# Patient Record
Sex: MALE | Race: WHITE | NOT HISPANIC OR LATINO | Employment: OTHER | URBAN - METROPOLITAN AREA
[De-identification: names, ages, dates, MRNs, and addresses within clinical notes are randomized per-mention and may not be internally consistent; named-entity substitution may affect disease eponyms.]

---

## 2017-01-03 ENCOUNTER — HOSPITAL ENCOUNTER (OUTPATIENT)
Dept: RADIOLOGY | Facility: MEDICAL CENTER | Age: 62
End: 2017-01-03
Attending: NURSE PRACTITIONER
Payer: COMMERCIAL

## 2017-01-03 ENCOUNTER — OFFICE VISIT (OUTPATIENT)
Dept: URGENT CARE | Facility: PHYSICIAN GROUP | Age: 62
End: 2017-01-03
Payer: COMMERCIAL

## 2017-01-03 VITALS
RESPIRATION RATE: 20 BRPM | HEART RATE: 100 BPM | WEIGHT: 175 LBS | BODY MASS INDEX: 26.52 KG/M2 | SYSTOLIC BLOOD PRESSURE: 144 MMHG | OXYGEN SATURATION: 95 % | HEIGHT: 68 IN | TEMPERATURE: 98.3 F | DIASTOLIC BLOOD PRESSURE: 80 MMHG

## 2017-01-03 DIAGNOSIS — M25.552 HIP PAIN, LEFT: ICD-10-CM

## 2017-01-03 PROCEDURE — 73501 X-RAY EXAM HIP UNI 1 VIEW: CPT | Mod: LT

## 2017-01-03 PROCEDURE — 99202 OFFICE O/P NEW SF 15 MIN: CPT | Performed by: NURSE PRACTITIONER

## 2017-01-03 RX ORDER — LISINOPRIL 5 MG/1
5 TABLET ORAL DAILY
COMMUNITY
End: 2018-04-25 | Stop reason: SDUPTHER

## 2017-01-03 ASSESSMENT — ENCOUNTER SYMPTOMS
FOCAL WEAKNESS: 0
CHILLS: 0
VOMITING: 0
ABDOMINAL PAIN: 0
MYALGIAS: 0
NAUSEA: 0
HIP PAIN: 1
BACK PAIN: 0
FEVER: 0
TINGLING: 0
NECK PAIN: 0
SENSORY CHANGE: 0

## 2017-01-03 NOTE — PROGRESS NOTES
"Subjective:      Demarcus Salas is a 61 y.o. male who presents with Hip Pain            HPI Comments: Medications, Allergies and Prior Medical Hx reviewed and updated in [facility].with patient/family today       Left hip pain, worse when climbing stairs or certain activities. Patient denies any trauma precipitated the incident. Patient does work out.    Hip Pain  This is a new problem. The current episode started 1 to 4 weeks ago. The problem occurs constantly. The problem has been gradually worsening. Pertinent negatives include no abdominal pain, chills, fever, myalgias, nausea, neck pain, rash or vomiting. The symptoms are aggravated by bending and walking (climbing stairs). He has tried nothing for the symptoms. The treatment provided no relief.       Review of Systems   Constitutional: Negative for fever, chills and malaise/fatigue.   Gastrointestinal: Negative for nausea, vomiting and abdominal pain.   Musculoskeletal: Positive for joint pain. Negative for myalgias, back pain and neck pain.   Skin: Negative for rash.   Neurological: Negative for tingling, sensory change and focal weakness.          Objective:     /80 mmHg  Pulse 100  Temp(Src) 36.8 °C (98.3 °F)  Resp 20  Ht 1.727 m (5' 7.99\")  Wt 79.379 kg (175 lb)  BMI 26.61 kg/m2  SpO2 95%     Physical Exam   Constitutional: He appears well-developed and well-nourished. No distress.   HENT:   Head: Normocephalic and atraumatic.   Eyes: Conjunctivae are normal.   Neck: Normal range of motion. Neck supple.   Cardiovascular: Normal rate.    Pulmonary/Chest: Effort normal and breath sounds normal. No respiratory distress. He exhibits no tenderness.   Musculoskeletal:        Left hip: He exhibits tenderness and bony tenderness. He exhibits normal range of motion, normal strength, no swelling, no crepitus and no deformity.        Legs:  No erythema, no ecchymosis   Neurological: He is alert.   Awake, alert, answering questions appropriately, moving all " extremeties   Skin: Skin is warm and dry. No erythema.   Psychiatric: He has a normal mood and affect. His behavior is normal.   Vitals reviewed.              Assessment/Plan:       1. Hip pain, left  DX-HIP-UNILATERAL-WITH PELVIS-1 VIEW LEFT    REFERRAL TO ORTHOPEDICS       Xray of left hip-  Reviewed film and radiology interpretation:      Rest, heat, Elevation, Ibuprofen,   Pt will go to the ER for worsening or changing symptoms as discussed,  Follow-up with orthopedics at the next available appt.  Follow-up with your primary care provider or return here for any problems or concerns Discharge instructions discussed with pt/family who verbalize understanding and agreement with poc    Called patient left message with x-ray results reviewed discharge instructions

## 2017-01-03 NOTE — MR AVS SNAPSHOT
"        Demarcus Salas   1/3/2017 10:45 AM   Office Visit   MRN: [id number]    Department:  [facility]   Dept Phone:  [phone]    Description:  Male : 1955   Provider:  ERIKA Rubi           Reason for Visit     Hip Pain 6 weeks      Allergies as of 1/3/2017     Allergen Noted Reactions    Penicillins 2012         You were diagnosed with     Hip pain, left   [578100]         Vital Signs     Blood Pressure Pulse Temperature Respirations Height Weight    144/80 mmHg 100 36.8 °C (98.3 °F) 20 1.727 m (5' 7.99\") 79.379 kg (175 lb)    Body Mass Index Oxygen Saturation Smoking Status             26.61 kg/m2 95% Never Smoker          Basic Information     Date Of Birth Sex Race Ethnicity Preferred Language    1955 Male White Non- English      Your appointments     Mar 16, 2017  1:00 PM   New Patient with ANNAMARIA Sanchez   [address] [address]   [phone]           Please bring Photo ID, Insurance Cards, All Medication Bottles and copies of any legal documents (such as Living Will, Power of ) If speaking a language besides English please bring an adult . Please arrive 30 minutes prior for check in and registration. You will be receiving a confirmation call a few days before your appointment from our automated call confirmation system.              Health Maintenance        Date Due Completion Dates    IMM DTaP/Tdap/Td Vaccine (1 - Tdap) 10/6/1975 ---    COLONOSCOPY 10/6/2006 ---    IMM INFLUENZA (1) 2016 ---    IMM ZOSTER VACCINE 10/6/2016 ---            Current Immunizations     No immunizations on file.      Below and/or attached are the medications your provider expects you to take. Review all of your home medications and newly ordered medications with your provider and/or pharmacist. Follow medication instructions as directed by your provider and/or pharmacist. " Please keep your medication list with you and share with your provider. Update the information when medications are discontinued, doses are changed, or new medications (including over-the-counter products) are added; and carry medication information at all times in the event of emergency situations     Allergies:  PENICILLINS - (reactions not documented)               Medications  Valid as of: January 03, 2017 - 10:56 AM    Generic Name Brand Name Tablet Size Instructions for use    Lisinopril (Tab) PRINIVIL 5 MG Take 5 mg by mouth every day.        Naproxen (Tab) NAPROSYN 500 MG Take 1 Tab by mouth 2 times a day, with meals.        NON SPECIFIED   Blood pressure Medication         Omeprazole (CAPSULE DELAYED RELEASE) PRILOSEC 20 MG Take 20 mg by mouth every day.          .                 Medicines prescribed today were sent to:     [facility]/PHARMACY #[id number] - [place], [place] - [address] [place] [address]    [address] [place] [address] [place] [address]    Phone: [phone] Fax: [phone]    Open 24 Hours?: No      Medication refill instructions:       If your prescription bottle indicates you have medication refills left, it is not necessary to call your provider’s office. Please contact your pharmacy and they will refill your medication.    If your prescription bottle indicates you do not have any refills left, you may request refills at any time through one of the following ways: The online [employer] system (except Urgent Care), by calling your provider’s office, or by asking your pharmacy to contact your provider’s office with a refill request. Medication refills are processed only during regular business hours and may not be available until the next business day. Your provider may request additional information or to have a follow-up visit with you prior to refilling your medication.   *Please Note: Medication refills are assigned a new Rx number when refilled electronically. Your pharmacy may indicate that no refills were authorized even  though a new prescription for the same medication is available at the pharmacy. Please request the medicine by name with the pharmacy before contacting your provider for a refill.        Your To Do List     Future Labs/Procedures Complete By Expires    DX-HIP-UNILATERAL-WITH PELVIS-1 VIEW LEFT  As directed 1/3/2018      Referral     A referral request has been sent to our patient care coordination department. Please allow 3-5 business days for us to process this request and contact you either by phone or mail. If you do not hear from us by the 5th business day, please call us at [phone].           [employer] Access Code: [email or web address]  Expires: 2/2/2017 10:31 AM    Control [employer] Technology  A secure, online tool to manage your health information     [employer]’s Control Medical Technology® is a secure, online tool that connects you to your personalized health information from the privacy of your home -- day or night - making it very easy for you to manage your healthcare. Once the activation process is completed, you can even access your medical information using the Control [employer] ahmet, which is available for free in the Apple Ahmet store or Google Play store.     [employer] Medical Technology provides the following levels of access (as shown below):   My Chart Features   [facility] Doctor [facility]  Specialists [facility]  Urgent  Care Non-[facility]  Primary Care  Doctor   Email your healthcare team securely and privately 24/7 X X X    Manage appointments: schedule your next appointment; view details of past/upcoming appointments X      Request prescription refills. X      View recent personal medical records, including lab and immunizations X X X X   View health record, including health history, allergies, medications X X X X   Read reports about your outpatient visits, procedures, consult and ER notes X X X X   See your discharge summary, which is a recap of your hospital and/or ER visit that includes your diagnosis, lab results, and care plan. X X       How to  register for OKpanda:  1. Go to  [email or web address].  2. Click on the Sign Up Now box, which takes you to the New Member Sign Up page. You will need to provide the following information:  a. Enter your OKpanda Access Code exactly as it appears at the top of this page. (You will not need to use this code after you’ve completed the sign-up process. If you do not sign up before the expiration date, you must request a new code.)   b. Enter your date of birth.   c. Enter your home email address.   d. Click Submit, and follow the next screen’s instructions.  3. Create a spotfluxt ID. This will be your OKpanda login ID and cannot be changed, so think of one that is secure and easy to remember.  4. Create a spotfluxt password. You can change your password at any time.  5. Enter your Password Reset Question and Answer. This can be used at a later time if you forget your password.   6. Enter your e-mail address. This allows you to receive e-mail notifications when new information is available in OKpanda.  7. Click Sign Up. You can now view your health information.    For assistance activating your OKpanda account, call [phone]

## 2017-01-04 ENCOUNTER — TELEPHONE (OUTPATIENT)
Dept: URGENT CARE | Facility: PHYSICIAN GROUP | Age: 62
End: 2017-01-04

## 2017-01-04 NOTE — TELEPHONE ENCOUNTER
1. Caller Name: self                                         Call Back Number: [phone] (home)     PT called stating that Maya Morrison called him about his X-ray, but he did not understand, will like a call back form maya please advised

## 2017-03-16 ENCOUNTER — APPOINTMENT (OUTPATIENT)
Dept: MEDICAL GROUP | Facility: PHYSICIAN GROUP | Age: 62
End: 2017-03-16
Payer: COMMERCIAL

## 2017-03-17 ENCOUNTER — OFFICE VISIT (OUTPATIENT)
Dept: MEDICAL GROUP | Facility: PHYSICIAN GROUP | Age: 62
End: 2017-03-17
Payer: COMMERCIAL

## 2017-03-17 VITALS
TEMPERATURE: 97.2 F | DIASTOLIC BLOOD PRESSURE: 72 MMHG | RESPIRATION RATE: 12 BRPM | HEART RATE: 100 BPM | BODY MASS INDEX: 26.37 KG/M2 | SYSTOLIC BLOOD PRESSURE: 130 MMHG | OXYGEN SATURATION: 94 % | WEIGHT: 174 LBS | HEIGHT: 68 IN

## 2017-03-17 DIAGNOSIS — K22.719 BARRETT'S ESOPHAGUS WITH DYSPLASIA: ICD-10-CM

## 2017-03-17 DIAGNOSIS — I10 ESSENTIAL HYPERTENSION: ICD-10-CM

## 2017-03-17 DIAGNOSIS — Z76.89 ENCOUNTER TO ESTABLISH CARE: ICD-10-CM

## 2017-03-17 DIAGNOSIS — R06.81 WITNESSED APNEIC SPELLS: ICD-10-CM

## 2017-03-17 DIAGNOSIS — Z00.00 ROUTINE HEALTH MAINTENANCE: ICD-10-CM

## 2017-03-17 DIAGNOSIS — Z13.220 SCREENING FOR LIPID DISORDERS: ICD-10-CM

## 2017-03-17 DIAGNOSIS — Z12.5 SCREENING FOR PROSTATE CANCER: ICD-10-CM

## 2017-03-17 PROCEDURE — 99214 OFFICE O/P EST MOD 30 MIN: CPT | Performed by: NURSE PRACTITIONER

## 2017-03-17 RX ORDER — CHLORAL HYDRATE 500 MG
1000 CAPSULE ORAL
COMMUNITY
End: 2018-01-07

## 2017-03-17 RX ORDER — LISINOPRIL AND HYDROCHLOROTHIAZIDE 25; 20 MG/1; MG/1
1 TABLET ORAL
Qty: 90 TAB | Refills: 3 | Status: SHIPPED | OUTPATIENT
Start: 2017-03-20 | End: 2018-01-07

## 2017-03-17 RX ORDER — LISINOPRIL AND HYDROCHLOROTHIAZIDE 25; 20 MG/1; MG/1
1 TABLET ORAL
Refills: 3 | COMMUNITY
Start: 2017-02-08 | End: 2017-03-17 | Stop reason: SDUPTHER

## 2017-03-17 RX ORDER — TURMERIC ROOT EXTRACT 500 MG
1 TABLET ORAL DAILY
COMMUNITY

## 2017-03-17 RX ORDER — VITAMIN E 268 MG
400 CAPSULE ORAL DAILY
COMMUNITY

## 2017-03-17 RX ORDER — CHOLECALCIFEROL (VITAMIN D3) 50 MCG
TABLET ORAL DAILY
COMMUNITY
End: 2020-01-09

## 2017-03-17 ASSESSMENT — PATIENT HEALTH QUESTIONNAIRE - PHQ9: CLINICAL INTERPRETATION OF PHQ2 SCORE: 0

## 2017-03-17 NOTE — ASSESSMENT & PLAN NOTE
For the past couple of years patient reports that his wife has been concerned about him waking up in the middle of the night gasping for air. It is worse when he has been drinking alcohol. His mother has a history of sleep apnea. He would like to be checked for sleep apnea. Denies any daytime fatigue.

## 2017-03-17 NOTE — ASSESSMENT & PLAN NOTE
Long-standing problem that patient has been managing with daily Prilosec. He recently had EGD completed with [facility] this year and was told that he should be on daily PPI therapy although patient is unsure that he wants to do this.

## 2017-03-17 NOTE — Clinical Note
[facility]  SUKI GarzaRBiancaN.  [address] [place] [address]  Fax: [phone]   Authorization for Release/Disclosure of   Protected Health Information   Name: RAYO HURTADO : 1955 SSN: [id number]   Address: [address] [place] [address] Phone:    [phone] (home)    I authorize the entity listed below to release/disclose the PHI below to:   [facility]/ANNAMARIA Garza and NIXON Garza.   Provider or Entity Name:  [facility]   Address   City, State, Zip   Phone:      Fax:     Reason for request: continuity of care   Information to be released:    [  ] LAST COLONOSCOPY,  including any PATH REPORT and follow-up  [  ] LAST FIT/COLOGUARD RESULT [  ] LAST DEXA  [  ] LAST MAMMOGRAM  [  ] LAST PAP  [  ] LAST LABS [  ] RETINA EXAM REPORT  [  ] IMMUNIZATION RECORDS  [X ] Release all info      [  ] Check here and initial the line next to each item to release ALL health information INCLUDING  _____ Care and treatment for drug and / or alcohol abuse  _____ HIV testing, infection status, or AIDS  _____ Genetic Testing    DATES OF SERVICE OR TIME PERIOD TO BE DISCLOSED: _____________  I understand and acknowledge that:  * This Authorization may be revoked at any time by you in writing, except if your health information has already been used or disclosed.  * Your health information that will be used or disclosed as a result of you signing this authorization could be re-disclosed by the recipient. If this occurs, your re-disclosed health information may no longer be protected by State or Federal laws.  * You may refuse to sign this Authorization. Your refusal will not affect your ability to obtain treatment.  * This Authorization becomes effective upon signing and will  on (date) __________.      If no date is indicated, this Authorization will  one (1) year from the signature date.    Name: Rayo Hurtado    Signature:   Date:     3/17/2017       PLEASE FAX  REQUESTED RECORDS BACK TO: [phone]

## 2017-03-17 NOTE — MR AVS SNAPSHOT
"        Demarcus Salas   3/17/2017 2:00 PM   Office Visit   MRN: [id number]    Department:  [facility]   Dept Phone:  [phone]    Description:  Male : 1955   Provider:  ANNAMARIA Garza           Reason for Visit     New Patient establish care, HTN      Allergies as of 3/17/2017     Allergen Noted Reactions    Penicillins 2012         You were diagnosed with     Encounter to establish care   [239616]       Essential hypertension   [2900071]       Ponce's esophagus with dysplasia   [100782]       Witnessed apneic spells   [178857]       Screening for prostate cancer   [604115]       Routine health maintenance   [166910]       Screening for lipid disorders   [2022410]         Vital Signs     Blood Pressure Pulse Temperature Respirations Height Weight    130/72 mmHg 100 36.2 °C (97.2 °F) 12 1.727 m (5' 8\") 78.926 kg (174 lb)    Body Mass Index Oxygen Saturation Smoking Status             26.46 kg/m2 94% Never Smoker          Basic Information     Date Of Birth Sex Race Ethnicity Preferred Language    1955 Male White Non- English      Problem List              ICD-10-CM Priority Class Noted - Resolved    Essential hypertension I10   3/17/2017 - Present    Ponce's esophagus with dysplasia K22.719   3/17/2017 - Present    Witnessed apneic spells R06.81   3/17/2017 - Present      Health Maintenance        Date Due Completion Dates    IMM DTaP/Tdap/Td Vaccine (1 - Tdap) 10/6/1975 ---    COLONOSCOPY 10/6/2006 ---    IMM INFLUENZA (1) 2016 ---    IMM ZOSTER VACCINE 10/6/2016 ---            Current Immunizations     No immunizations on file.      Below and/or attached are the medications your provider expects you to take. Review all of your home medications and newly ordered medications with your provider and/or pharmacist. Follow medication instructions as directed by your provider and/or pharmacist. Please keep your medication list with you and share with your provider. Update the " information when medications are discontinued, doses are changed, or new medications (including over-the-counter products) are added; and carry medication information at all times in the event of emergency situations     Allergies:  PENICILLINS - (reactions not documented)               Medications  Valid as of: March 17, 2017 -  2:53 PM    Generic Name Brand Name Tablet Size Instructions for use    Acetylcysteine (Cap) Acetylcysteine 600 MG Take  by mouth.        Cholecalciferol (Tab) vitamin D 2000 UNIT Take  by mouth every day.        Lisinopril (Tab) PRINIVIL 5 MG Take 5 mg by mouth every day.        Lisinopril-Hydrochlorothiazide (Tab) PRINZIDE, ZESTORETIC 20-25 MG Take 1 Tab by mouth every day.        Milk Thistle (Cap) Milk Thistle 250 MG Take  by mouth.        Naproxen (Tab) NAPROSYN 500 MG Take 1 Tab by mouth 2 times a day, with meals.        NON SPECIFIED   Blood pressure Medication         Omega-3 Fatty Acids (Cap) fish oil 1000 MG Take 1,000 mg by mouth 3 times a day, with meals.        Omeprazole (CAPSULE DELAYED RELEASE) PRILOSEC 20 MG Take 20 mg by mouth every day.          Turmeric (Tab) Turmeric 500 MG Take  by mouth.        Vitamin E (Cap) VITAMIN E 400 UNIT Take 400 Units by mouth every day.        .                 Medicines prescribed today were sent to:     [facility]/PHARMACY #[id number] - [place] - [address] [place]    [address] [place] [address]    Phone: [phone] Fax: [phone]    Open 24 Hours?: No      Medication refill instructions:       If your prescription bottle indicates you have medication refills left, it is not necessary to call your provider’s office. Please contact your pharmacy and they will refill your medication.    If your prescription bottle indicates you do not have any refills left, you may request refills at any time through one of the following ways: The online [employer] system (except Urgent Care), by calling your provider’s office, or by asking your pharmacy to  contact your provider’s office with a refill request. Medication refills are processed only during regular business hours and may not be available until the next business day. Your provider may request additional information or to have a follow-up visit with you prior to refilling your medication.   *Please Note: Medication refills are assigned a new Rx number when refilled electronically. Your pharmacy may indicate that no refills were authorized even though a new prescription for the same medication is available at the pharmacy. Please request the medicine by name with the pharmacy before contacting your provider for a refill.        Your To Do List     Future Labs/Procedures Complete By Expires    CBC WITH DIFFERENTIAL  As directed 3/18/2018    COMP METABOLIC PANEL  As directed 3/18/2018    LIPID PROFILE  As directed 3/18/2018    PROSTATE SPECIFIC AG SCREENING  As directed 3/18/2018    TSH  As directed 3/18/2018    VITAMIN D,25 HYDROXY  As directed 3/18/2018      Referral     A referral request has been sent to our patient care coordination department. Please allow 3-5 business days for us to process this request and contact you either by phone or mail. If you do not hear from us by the 5th business day, please call us at [phone].           Practice Ignition Access Code: [email or web address]  Expires: 4/14/2017  2:09 PM    Practice Ignition  A secure, online tool to manage your health information     [employer]’s Practice Ignition® is a secure, online tool that connects you to your personalized health information from the privacy of your home -- day or night - making it very easy for you to manage your healthcare. Once the activation process is completed, you can even access your medical information using the Practice Ignition ahmet, which is available for free in the Apple Ahmet store or Google Play store.     Practice Ignition provides the following levels of access (as shown below):   My Chart Features   [facility] Primary Care Doctor [facility]  Specialists  [facility]  Urgent  Care [facility]  Primary Care  Doctor   Email your healthcare team securely and privately 24/7 X X X    Manage appointments: schedule your next appointment; view details of past/upcoming appointments X      Request prescription refills. X      View recent personal medical records, including lab and immunizations X X X X   View health record, including health history, allergies, medications X X X X   Read reports about your outpatient visits, procedures, consult and ER notes X X X X   See your discharge summary, which is a recap of your hospital and/or ER visit that includes your diagnosis, lab results, and care plan. X X       How to register for SocialMeterTV:  1. Go to  [email or web address].  2. Click on the Sign Up Now box, which takes you to the New Member Sign Up page. You will need to provide the following information:  a. Enter your SocialMeterTV Access Code exactly as it appears at the top of this page. (You will not need to use this code after you’ve completed the sign-up process. If you do not sign up before the expiration date, you must request a new code.)   b. Enter your date of birth.   c. Enter your home email address.   d. Click Submit, and follow the next screen’s instructions.  3. Create a SocialMeterTV ID. This will be your SocialMeterTV login ID and cannot be changed, so think of one that is secure and easy to remember.  4. Create a SocialMeter[employer] password. You can change your password at any time.  5. Enter your Password Reset Question and Answer. This can be used at a later time if you forget your password.   6. Enter your e-mail address. This allows you to receive e-mail notifications when new information is available in SocialMeterTV.  7. Click Sign Up. You can now view your health information.    For assistance activating your SocialMeterTV account, call [phone]

## 2017-03-18 NOTE — ASSESSMENT & PLAN NOTE
Long-standing problem that has been well-controlled with lisinopril-hydrochlorothiazide 20-25 mg daily. Denies any headache dizziness.does not monitor blood pressure at home

## 2017-03-28 ENCOUNTER — HOSPITAL ENCOUNTER (OUTPATIENT)
Dept: LAB | Facility: MEDICAL CENTER | Age: 62
End: 2017-03-28
Attending: NURSE PRACTITIONER
Payer: COMMERCIAL

## 2017-03-28 DIAGNOSIS — Z13.220 SCREENING FOR LIPID DISORDERS: ICD-10-CM

## 2017-03-28 DIAGNOSIS — Z00.00 ROUTINE HEALTH MAINTENANCE: ICD-10-CM

## 2017-03-28 DIAGNOSIS — Z12.5 SCREENING FOR PROSTATE CANCER: ICD-10-CM

## 2017-03-28 DIAGNOSIS — I10 ESSENTIAL HYPERTENSION: ICD-10-CM

## 2017-03-28 LAB
25(OH)D3 SERPL-MCNC: 39 NG/ML (ref 30–100)
ALBUMIN SERPL BCP-MCNC: 4.1 G/DL (ref 3.2–4.9)
ALBUMIN/GLOB SERPL: 1.2 G/DL
ALP SERPL-CCNC: 68 U/L (ref 30–99)
ALT SERPL-CCNC: 32 U/L (ref 2–50)
ANION GAP SERPL CALC-SCNC: 7 MMOL/L (ref 0–11.9)
AST SERPL-CCNC: 36 U/L (ref 12–45)
BASOPHILS # BLD AUTO: 0.05 K/UL (ref 0–0.12)
BASOPHILS NFR BLD AUTO: 0.8 % (ref 0–1.8)
BILIRUB SERPL-MCNC: 1.2 MG/DL (ref 0.1–1.5)
BUN SERPL-MCNC: 17 MG/DL (ref 8–22)
CALCIUM SERPL-MCNC: 9.3 MG/DL (ref 8.5–10.5)
CHLORIDE SERPL-SCNC: 104 MMOL/L (ref 96–112)
CHOLEST SERPL-MCNC: 172 MG/DL (ref 100–199)
CO2 SERPL-SCNC: 28 MMOL/L (ref 20–33)
CREAT SERPL-MCNC: 0.98 MG/DL (ref 0.5–1.4)
EOSINOPHIL # BLD: 0.28 K/UL (ref 0–0.51)
EOSINOPHIL NFR BLD AUTO: 4.5 % (ref 0–6.9)
ERYTHROCYTE [DISTWIDTH] IN BLOOD BY AUTOMATED COUNT: 44.7 FL (ref 35.9–50)
GLOBULIN SER CALC-MCNC: 3.3 G/DL (ref 1.9–3.5)
GLUCOSE SERPL-MCNC: 82 MG/DL (ref 65–99)
HCT VFR BLD AUTO: 44.2 % (ref 42–52)
HDLC SERPL-MCNC: 90 MG/DL
HGB BLD-MCNC: 14.9 G/DL (ref 14–18)
IMM GRANULOCYTES # BLD AUTO: 0.02 K/UL (ref 0–0.11)
IMM GRANULOCYTES NFR BLD AUTO: 0.3 % (ref 0–0.9)
LDLC SERPL CALC-MCNC: 74 MG/DL
LYMPHOCYTES # BLD: 1.46 K/UL (ref 1–4.8)
LYMPHOCYTES NFR BLD AUTO: 23.3 % (ref 22–41)
MCH RBC QN AUTO: 31.9 PG (ref 27–33)
MCHC RBC AUTO-ENTMCNC: 33.7 G/DL (ref 33.7–35.3)
MCV RBC AUTO: 94.6 FL (ref 81.4–97.8)
MONOCYTES # BLD: 0.86 K/UL (ref 0–0.85)
MONOCYTES NFR BLD AUTO: 13.7 % (ref 0–13.4)
NEUTROPHILS # BLD: 3.6 K/UL (ref 1.82–7.42)
NEUTROPHILS NFR BLD AUTO: 57.4 % (ref 44–72)
NRBC # BLD AUTO: 0 K/UL
NRBC BLD-RTO: 0 /100 WBC
PLATELET # BLD AUTO: 260 K/UL (ref 164–446)
PMV BLD AUTO: 10.4 FL (ref 9–12.9)
POTASSIUM SERPL-SCNC: 4.4 MMOL/L (ref 3.6–5.5)
PROT SERPL-MCNC: 7.4 G/DL (ref 6–8.2)
PSA SERPL DL<=0.01 NG/ML-MCNC: 1.52 NG/ML (ref 0–4)
RBC # BLD AUTO: 4.67 M/UL (ref 4.7–6.1)
SODIUM SERPL-SCNC: 139 MMOL/L (ref 135–145)
TRIGL SERPL-MCNC: 38 MG/DL (ref 0–149)
TSH SERPL DL<=0.005 MIU/L-ACNC: 1.22 UIU/ML (ref 0.3–3.7)
WBC # BLD AUTO: 6.3 K/UL (ref 4.8–10.8)

## 2017-03-28 PROCEDURE — 36415 COLL VENOUS BLD VENIPUNCTURE: CPT

## 2017-03-28 PROCEDURE — 84443 ASSAY THYROID STIM HORMONE: CPT

## 2017-03-28 PROCEDURE — 80053 COMPREHEN METABOLIC PANEL: CPT

## 2017-03-28 PROCEDURE — 80061 LIPID PANEL: CPT

## 2017-03-28 PROCEDURE — 85025 COMPLETE CBC W/AUTO DIFF WBC: CPT

## 2017-03-28 PROCEDURE — 84153 ASSAY OF PSA TOTAL: CPT

## 2017-03-28 PROCEDURE — 82306 VITAMIN D 25 HYDROXY: CPT

## 2017-04-04 ENCOUNTER — OFFICE VISIT (OUTPATIENT)
Dept: MEDICAL GROUP | Facility: PHYSICIAN GROUP | Age: 62
End: 2017-04-04
Payer: COMMERCIAL

## 2017-04-04 VITALS
RESPIRATION RATE: 12 BRPM | TEMPERATURE: 95.9 F | WEIGHT: 170 LBS | HEIGHT: 68 IN | BODY MASS INDEX: 25.76 KG/M2 | DIASTOLIC BLOOD PRESSURE: 78 MMHG | SYSTOLIC BLOOD PRESSURE: 128 MMHG | HEART RATE: 91 BPM | OXYGEN SATURATION: 97 %

## 2017-04-04 DIAGNOSIS — Z23 NEED FOR VACCINATION: ICD-10-CM

## 2017-04-04 DIAGNOSIS — K22.719 BARRETT'S ESOPHAGUS WITH DYSPLASIA: ICD-10-CM

## 2017-04-04 DIAGNOSIS — Z00.00 ANNUAL PHYSICAL EXAM: ICD-10-CM

## 2017-04-04 DIAGNOSIS — R06.81 WITNESSED APNEIC SPELLS: ICD-10-CM

## 2017-04-04 DIAGNOSIS — I10 ESSENTIAL HYPERTENSION: ICD-10-CM

## 2017-04-04 LAB
APPEARANCE UR: CLEAR
BILIRUB UR STRIP-MCNC: NORMAL MG/DL
COLOR UR AUTO: YELLOW
GLUCOSE UR STRIP.AUTO-MCNC: NORMAL MG/DL
KETONES UR STRIP.AUTO-MCNC: NORMAL MG/DL
LEUKOCYTE ESTERASE UR QL STRIP.AUTO: NORMAL
NITRITE UR QL STRIP.AUTO: NORMAL
PH UR STRIP.AUTO: 6.5 [PH] (ref 5–8)
PROT UR QL STRIP: NORMAL MG/DL
RBC UR QL AUTO: NORMAL
SP GR UR STRIP.AUTO: 1.01
UROBILINOGEN UR STRIP-MCNC: 0.2 MG/DL

## 2017-04-04 PROCEDURE — 81002 URINALYSIS NONAUTO W/O SCOPE: CPT | Performed by: NURSE PRACTITIONER

## 2017-04-04 PROCEDURE — 99396 PREV VISIT EST AGE 40-64: CPT | Mod: 25 | Performed by: NURSE PRACTITIONER

## 2017-04-04 PROCEDURE — 90715 TDAP VACCINE 7 YRS/> IM: CPT | Performed by: NURSE PRACTITIONER

## 2017-04-04 PROCEDURE — 90471 IMMUNIZATION ADMIN: CPT | Performed by: NURSE PRACTITIONER

## 2017-04-04 PROCEDURE — 90736 HZV VACCINE LIVE SUBQ: CPT | Performed by: NURSE PRACTITIONER

## 2017-04-04 PROCEDURE — 90472 IMMUNIZATION ADMIN EACH ADD: CPT | Performed by: NURSE PRACTITIONER

## 2017-04-04 NOTE — ASSESSMENT & PLAN NOTE
Established problem. Referral has been processed but he has not made appointment for sleep study yet

## 2017-04-04 NOTE — PROGRESS NOTES
Demarcus Salas is a 61 y.o. male here for an annual physical exam.    HPI:    Demarcus is a 61-year-old white male here for an annual physical exam. He has no questions or concerns to address today. I have reviewed the following blood work with him that reveals stable blood count without anemia, cholesterol numbers very good, normal liver and kidney function, glucose without concern for diabetes, vitamin D level normal, thyroid level normal, and prostate level normal. Witnessed apneic spells  Established problem. Referral has been processed but he has not made appointment for sleep study yet    Essential hypertension  Established problem well controlled with current medications    Ponce's esophagus with dysplasia  Established problem well controlled with daily omeprazole.       Ref. Range 3/28/2017 06:12   WBC Latest Ref Range: 4.8-10.8 K/uL 6.3   RBC Latest Ref Range: 4.70-6.10 M/uL 4.67 (L)   Hemoglobin Latest Ref Range: 14.0-18.0 g/dL 14.9   Hematocrit Latest Ref Range: 42.0-52.0 % 44.2   MCV Latest Ref Range: 81.4-97.8 fL 94.6   MCH Latest Ref Range: 27.0-33.0 pg 31.9   MCHC Latest Ref Range: 33.7-35.3 g/dL 33.7   RDW Latest Ref Range: 35.9-50.0 fL 44.7   Platelet Count Latest Ref Range: 164-446 K/uL 260   MPV Latest Ref Range: 9.0-12.9 fL 10.4   Neutrophils-Polys Latest Ref Range: 44.00-72.00 % 57.40   Neutrophils (Absolute) Latest Ref Range: 1.82-7.42 K/uL 3.60   Lymphocytes Latest Ref Range: 22.00-41.00 % 23.30   Lymphs (Absolute) Latest Ref Range: 1.00-4.80 K/uL 1.46   Monocytes Latest Ref Range: 0.00-13.40 % 13.70 (H)   Monos (Absolute) Latest Ref Range: 0.00-0.85 K/uL 0.86 (H)   Eosinophils Latest Ref Range: 0.00-6.90 % 4.50   Eos (Absolute) Latest Ref Range: 0.00-0.51 K/uL 0.28   Basophils Latest Ref Range: 0.00-1.80 % 0.80   Baso (Absolute) Latest Ref Range: 0.00-0.12 K/uL 0.05   Immature Granulocytes Latest Ref Range: 0.00-0.90 % 0.30   Immature Granulocytes (abs) Latest Ref Range: 0.00-0.11 K/uL 0.02    Nucleated RBC Latest Units: /100 WBC 0.00   NRBC (Absolute) Latest Units: K/uL 0.00   Sodium Latest Ref Range: 135-145 mmol/L 139   Potassium Latest Ref Range: 3.6-5.5 mmol/L 4.4   Chloride Latest Ref Range:  mmol/L 104   Co2 Latest Ref Range: 20-33 mmol/L 28   Anion Gap Latest Ref Range: 0.0-11.9  7.0   Glucose Latest Ref Range: 65-99 mg/dL 82   Bun Latest Ref Range: 8-22 mg/dL 17   Creatinine Latest Ref Range: 0.50-1.40 mg/dL 0.98   GFR If  Latest Ref Range: >60 mL/min/1.73 m 2 >60   GFR If Non  Latest Ref Range: >60 mL/min/1.73 m 2 >60   Calcium Latest Ref Range: 8.5-10.5 mg/dL 9.3   AST(SGOT) Latest Ref Range: 12-45 U/L 36   ALT(SGPT) Latest Ref Range: 2-50 U/L 32   Alkaline Phosphatase Latest Ref Range: 30-99 U/L 68   Total Bilirubin Latest Ref Range: 0.1-1.5 mg/dL 1.2   Albumin Latest Ref Range: 3.2-4.9 g/dL 4.1   Total Protein Latest Ref Range: 6.0-8.2 g/dL 7.4   Globulin Latest Ref Range: 1.9-3.5 g/dL 3.3   A-G Ratio Latest Units: g/dL 1.2   Cholesterol,Tot Latest Ref Range: 100-199 mg/dL 172   Triglycerides Latest Ref Range: 0-149 mg/dL 38   HDL Latest Ref Range: >=40 mg/dL 90   LDL Latest Ref Range: <100 mg/dL 74   Prostatic Specific Antigen Tot Latest Ref Range: 0.00-4.00 ng/mL 1.52   25-Hydroxy   Vitamin D 25 Latest Ref Range:  ng/mL 39   TSH Latest Ref Range: 0.300-3.700 uIU/mL 1.220       Current medicines (including changes today)  Current Outpatient Prescriptions   Medication Sig Dispense Refill   • Omega-3 Fatty Acids (FISH OIL) 1000 MG Cap capsule Take 1,000 mg by mouth 3 times a day, with meals.     • Acetylcysteine (NAC) 600 MG Cap Take  by mouth.     • Cholecalciferol (VITAMIN D) 2000 UNIT Tab Take  by mouth every day.     • Turmeric 500 MG Tab Take  by mouth.     • vitamin e (VITAMIN E) 400 UNIT Cap Take 400 Units by mouth every day.     • lisinopril-hydrochlorothiazide (PRINZIDE, ZESTORETIC) 20-25 MG per tablet Take 1 Tab by mouth every day. 90 Tab  3   • omeprazole (PRILOSEC) 20 MG CPDR Take 20 mg by mouth every day.       • NON SPECIFIED Blood pressure Medication      • naproxen (NAPROSYN) 500 MG TABS Take 1 Tab by mouth 2 times a day, with meals. 30 Tab 0   • Milk Thistle 250 MG Cap Take  by mouth.     • lisinopril (PRINIVIL) 5 MG Tab Take 5 mg by mouth every day.       No current facility-administered medications for this visit.     He  has a past medical history of Hypertension and Ponce esophagus.  He  has past surgical history that includes rotator cuff repair (Bilateral) and meniscus repair.  Social History   Substance Use Topics   • Smoking status: Never Smoker    • Smokeless tobacco: Never Used   • Alcohol Use: 12.6 oz/week     14 Glasses of wine, 7 Shots of liquor per week     Social History     Social History Narrative    Patient is  with one adult son. 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     Family History   Problem Relation Age of Onset   • Diabetes Mother    • Heart Failure Mother    • Sleep Apnea Mother    • Lung Cancer Father    • Lung Disease Father    • No Known Problems Brother    • No Known Problems Brother    • No Known Problems Son      Family Status   Relation Status Death Age   • Mother     • Father     • Brother Alive    • Brother Alive    • Son Alive        Health and Wellness  Nutrition: healthy diet   Exercise: no regular exercise at this time but plans to restart once done moving  Last Dental exam: a few months ago   Last Eye Exam: many years, 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 No neck pain or limitation of motion, lumps or swelling, stiffness, or ashly enlargement  Eyes: +blurred vision. No change in vision, flashing lights, pain, redness, discharge  Ears: No pain, tinnitus, discharge, hearing loss, dizziness  Nose: No discharge, sinus pain, nosebleeds,  "allergies  Mouth/Throat: No sores or lesions, sore throat, hoarseness, dysphagia  Lungs: +witnessed apneic spells and snoring. No cough, sob, dyspnea, chest pain with breathing, wheezing, sputum, hemoptysis  Cardiac: No chest pain, palpitations, syncope or near syncope, JAY, PND, or LE edema   Skin: No color changes, itching, dryness, rashes  Heme: No increased bruising or prolonged bleeding  Endo: No heat or cold intolerance, polyuria, polydipsia, polyphagia  GI: No pain, n/v, heartburn, blood in stool, constipation or diarrhea  : No dysuria, dribbling, hesitancy, frequency, nocturia, or hematuria  MSK: No joint pain, stiffness, swelling, heat, redness, or limitation of movement.      Objective:     Blood pressure 128/78, pulse 91, temperature 35.5 °C (95.9 °F), resp. rate 12, height 1.727 m (5' 8\"), weight 77.111 kg (170 lb), SpO2 97 %. Body mass index is 25.85 kg/(m^2).  Physical Exam:  Constitutional: Alert, no distress.  Eye contact is good, speech goal directed, affect bright.   Skin: Warm, dry, good turgor, no rashes in visible areas.  Eye: EOMI, PERRL, conjunctiva clear, sclera non-icteric, lids normal.  ENMT: Pinna normal , external auditory canals without erythema, TM pearly gray with normal light reflex bilat. Lips without lesions, good dentition, oropharynx clear, nares patent with no significant edema or drainage.  Neck: Supple, trachea midline, no masses, no thyromegaly. No cervical or supraclavicular lymphadenopathy.  Respiratory: Unlabored respiratory effort with good excursion, lungs clear to auscultation, no wheezes, no ronchi.  Cardiovascular: Normal S1, S2, regular rhythm, no murmur, no edema, no carotid bruits.  Abdomen: Soft, ND, non-tender, no masses, no hepatosplenomegaly or hernias. No CVAT. Bowel sounds active X4 quads.  Rectal exam: no external hemorrhoid tags, sphincter tone normal, prostate without enlargement or nodules, non-tender. Stool guiac negative.   Lower extremities color " normal, vascularity normal, no edema, temperature normal  MSK: No joint erythema or deformity.   Psych: Alert and oriented x3, normal affect and mood.       Ref. Range 4/4/2017 09:43   POC Color Latest Ref Range: Negative  yellow   POC Appearance Latest Ref Range: Negative  clear   POC Specific Gravity Latest Ref Range: <1.005->1.030  1.010   POC Urine PH Latest Ref Range: 5.0-8.0  6.5   POC Glucose Latest Ref Range: Negative mg/dL neg   POC Ketones Latest Ref Range: Negative mg/dL neg   POC Protein Latest Ref Range: Negative mg/dL neg   POC Nitrites Latest Ref Range: Negative  neg   POC Leukocyte Esterase Latest Ref Range: Negative  neg   POC Blood Latest Ref Range: Negative  neg   POC Biliruben Latest Ref Range: Negative mg/dL neg   POC Urobiligen Latest Ref Range: Negative (0.2) mg/dL 0.2       Assessment and Plan:   The following treatment plan was discussed   1. Annual physical exam  Well adult with no abnormal PE findings.   POCT Urinalysis    REFERRAL TO OPTOMETRY   2. Essential hypertension  Controlled on current meds   3. Ponce's esophagus with dysplasia  Stable with chronic use of PPI   4. Witnessed apneic spells  Information given to patient to schedule    5. Need for vaccination  TDAP VACCINE =>8YO IM    VARICELLA ZOSTER VACCINE SQ  I have placed the below orders and discussed them with an approved delegating provider. The MA is performing the below orders under the direction of Dr. Quiroz       Lifestyle health and wellness tips discussed including nutrition, diet, signs of illness, and regular follow-up.   Followup: Return in about 1 year (around 4/4/2018) for Annual.

## 2017-04-13 PROBLEM — K26.9 DUODENAL ULCER: Status: ACTIVE | Noted: 2017-04-13

## 2017-06-27 ENCOUNTER — OFFICE VISIT (OUTPATIENT)
Dept: URGENT CARE | Facility: PHYSICIAN GROUP | Age: 62
End: 2017-06-27
Payer: COMMERCIAL

## 2017-06-27 VITALS
SYSTOLIC BLOOD PRESSURE: 130 MMHG | RESPIRATION RATE: 16 BRPM | BODY MASS INDEX: 25.01 KG/M2 | DIASTOLIC BLOOD PRESSURE: 78 MMHG | OXYGEN SATURATION: 97 % | WEIGHT: 165 LBS | TEMPERATURE: 98.8 F | HEART RATE: 84 BPM | HEIGHT: 68 IN

## 2017-06-27 DIAGNOSIS — T63.461A WASP STING, ACCIDENTAL OR UNINTENTIONAL, INITIAL ENCOUNTER: ICD-10-CM

## 2017-06-27 PROCEDURE — 99214 OFFICE O/P EST MOD 30 MIN: CPT | Performed by: FAMILY MEDICINE

## 2017-06-27 RX ORDER — EPINEPHRINE 1 MG/ML
0.5 INJECTION INTRAMUSCULAR; INTRAVENOUS; SUBCUTANEOUS ONCE
Status: COMPLETED | OUTPATIENT
Start: 2017-06-27 | End: 2017-06-27

## 2017-06-27 RX ORDER — METHYLPREDNISOLONE SODIUM SUCCINATE 125 MG/2ML
125 INJECTION, POWDER, LYOPHILIZED, FOR SOLUTION INTRAMUSCULAR; INTRAVENOUS ONCE
Status: COMPLETED | OUTPATIENT
Start: 2017-06-27 | End: 2017-06-27

## 2017-06-27 RX ORDER — EPINEPHRINE 1 MG/ML
0.5 INJECTION INTRAMUSCULAR; INTRAVENOUS; SUBCUTANEOUS ONCE
Status: DISCONTINUED | OUTPATIENT
Start: 2017-06-27 | End: 2017-06-27

## 2017-06-27 RX ORDER — DIPHENHYDRAMINE HYDROCHLORIDE 50 MG/ML
25 INJECTION INTRAMUSCULAR; INTRAVENOUS ONCE
Status: COMPLETED | OUTPATIENT
Start: 2017-06-27 | End: 2017-06-27

## 2017-06-27 RX ORDER — METHYLPREDNISOLONE SODIUM SUCCINATE 40 MG/ML
40 INJECTION, POWDER, LYOPHILIZED, FOR SOLUTION INTRAMUSCULAR; INTRAVENOUS ONCE
Status: DISCONTINUED | OUTPATIENT
Start: 2017-06-27 | End: 2017-06-27

## 2017-06-27 RX ORDER — SULFAMETHOXAZOLE AND TRIMETHOPRIM 800; 160 MG/1; MG/1
1 TABLET ORAL 2 TIMES DAILY
Qty: 20 TAB | Refills: 0 | Status: SHIPPED | OUTPATIENT
Start: 2017-06-27 | End: 2017-07-07

## 2017-06-27 RX ORDER — CETIRIZINE HYDROCHLORIDE 10 MG/1
10 TABLET ORAL DAILY
Qty: 30 TAB | Refills: 0 | Status: SHIPPED | OUTPATIENT
Start: 2017-06-27 | End: 2018-01-07

## 2017-06-27 RX ORDER — RANITIDINE 150 MG/1
150 TABLET ORAL 2 TIMES DAILY
Qty: 60 TAB | Refills: 0 | Status: SHIPPED | OUTPATIENT
Start: 2017-06-27 | End: 2017-07-28 | Stop reason: SDUPTHER

## 2017-06-27 RX ADMIN — METHYLPREDNISOLONE SODIUM SUCCINATE 125 MG: 125 INJECTION, POWDER, LYOPHILIZED, FOR SOLUTION INTRAMUSCULAR; INTRAVENOUS at 09:24

## 2017-06-27 RX ADMIN — EPINEPHRINE 0.5 MG: 1 INJECTION INTRAMUSCULAR; INTRAVENOUS; SUBCUTANEOUS at 17:35

## 2017-06-27 RX ADMIN — DIPHENHYDRAMINE HYDROCHLORIDE 25 MG: 50 INJECTION INTRAMUSCULAR; INTRAVENOUS at 09:22

## 2017-06-27 ASSESSMENT — ENCOUNTER SYMPTOMS
CHILLS: 0
NAUSEA: 0
VOMITING: 0
VISUAL CHANGE: 0
FEVER: 0

## 2017-06-27 NOTE — MR AVS SNAPSHOT
"        Demarcus Salas   2017 8:15 AM   Office Visit   MRN: [id number]    Department:  [facility]   Dept Phone:  [phone]    Description:  Male : 1955   Provider:  Cristiano Oliva M.D.           Reason for Visit     Allergic Reaction allergic reation to wasp sting, face swelling, getting worse      Allergies as of 2017     Allergen Noted Reactions    Penicillins 2012         You were diagnosed with     Wasp sting, accidental or unintentional, initial encounter   [[id number]]         Vital Signs     Blood Pressure Pulse Temperature Respirations Height Weight    130/78 mmHg 84 37.1 °C (98.8 °F) 16 1.727 m (5' 7.99\") 74.844 kg (165 lb)    Body Mass Index Oxygen Saturation Smoking Status             25.09 kg/m2 97% Never Smoker          Basic Information     Date Of Birth Sex Race Ethnicity Preferred Language    1955 Male White Non- English      Problem List              ICD-10-CM Priority Class Noted - Resolved    Essential hypertension I10   3/17/2017 - Present    Ponce's esophagus with dysplasia K22.719   3/17/2017 - Present    Witnessed apneic spells R06.81   3/17/2017 - Present    Duodenal ulcer K26.9   2017 - Present      Health Maintenance        Date Due Completion Dates    COLONOSCOPY 10/6/2006 ---    IMM DTaP/Tdap/Td Vaccine (2 - Td) 2027            Current Immunizations     SHINGLES VACCINE 2017    Tdap Vaccine 2017      Below and/or attached are the medications your provider expects you to take. Review all of your home medications and newly ordered medications with your provider and/or pharmacist. Follow medication instructions as directed by your provider and/or pharmacist. Please keep your medication list with you and share with your provider. Update the information when medications are discontinued, doses are changed, or new medications (including over-the-counter products) are added; and carry medication information at all times in " the event of emergency situations     Allergies:  PENICILLINS - (reactions not documented)               Medications  Valid as of: June 27, 2017 - 12:32 PM    Generic Name Brand Name Tablet Size Instructions for use    Acetylcysteine (Cap) Acetylcysteine 600 MG Take  by mouth.        Cetirizine HCl (Tab) ZYRTEC 10 MG Take 1 Tab by mouth every day.        Cholecalciferol (Tab) vitamin D 2000 UNIT Take  by mouth every day.        Lisinopril (Tab) PRINIVIL 5 MG Take 5 mg by mouth every day.        Lisinopril-Hydrochlorothiazide (Tab) PRINZIDE, ZESTORETIC 20-25 MG Take 1 Tab by mouth every day.        Milk Thistle (Cap) Milk Thistle 250 MG Take  by mouth.        Naproxen (Tab) NAPROSYN 500 MG Take 1 Tab by mouth 2 times a day, with meals.        NON SPECIFIED   Blood pressure Medication         Omega-3 Fatty Acids (Cap) fish oil 1000 MG Take 1,000 mg by mouth 3 times a day, with meals.        Omeprazole (CAPSULE DELAYED RELEASE) PRILOSEC 20 MG Take 20 mg by mouth every day.          RaNITidine HCl (Tab) ZANTAC 150 MG Take 1 Tab by mouth 2 times a day.        Sulfamethoxazole-Trimethoprim (Tab) BACTRIM -160 MG Take 1 Tab by mouth 2 times a day for 10 days.        Turmeric (Tab) Turmeric 500 MG Take  by mouth.        Vitamin E (Cap) VITAMIN E 400 UNIT Take 400 Units by mouth every day.        .                 Medicines prescribed today were sent to:     [id number] - [place], [place] - [address]    [address] [facility] [address] [place] [place] [address]    Phone: [phone] Fax: [phone]    Open 24 Hours?: No      Medication refill instructions:       If your prescription bottle indicates you have medication refills left, it is not necessary to call your provider’s office. Please contact your pharmacy and they will refill your medication.    If your prescription bottle indicates you do not have any refills left, you may request refills at any time through one of the following ways: The online [employer] system (except Urgent  Care), by calling your provider’s office, or by asking your pharmacy to contact your provider’s office with a refill request. Medication refills are processed only during regular business hours and may not be available until the next business day. Your provider may request additional information or to have a follow-up visit with you prior to refilling your medication.   *Please Note: Medication refills are assigned a new Rx number when refilled electronically. Your pharmacy may indicate that no refills were authorized even though a new prescription for the same medication is available at the pharmacy. Please request the medicine by name with the pharmacy before contacting your provider for a refill.        Instructions    Bee, Wasp, or Hornet Sting  Your caregiver has diagnosed you as having an insect sting. An insect sting appears as a red lump in the skin that sometimes has a tiny hole in the center, or it may have a stinger in the center of the wound. The most common stings are from wasps, hornets and bees.  Individuals have different reactions to insect stings.  · A normal reaction may cause pain, swelling, and redness around the sting site.  · A localized allergic reaction may cause swelling and redness that extends beyond the sting site.  · A large local reaction may continue to develop over the next 12 to 36 hours.  · On occasion, the reactions can be severe (anaphylactic reaction). An anaphylactic reaction may cause wheezing; difficulty breathing; chest pain; fainting; raised, itchy, red patches on the skin; a sick feeling to your stomach (nausea); vomiting; cramping; or diarrhea. If you have had an anaphylactic reaction to an insect sting in the past, you are more likely to have one again.  HOME CARE INSTRUCTIONS   · With bee stings, a small sac of poison is left in the wound. Brushing across this with something such as a credit card, or anything similar, will help remove this and decrease the amount of the  reaction. This same procedure will not help a wasp sting as they do not leave behind a stinger and poison sac.  · Apply a cold compress for 10 to 20 minutes every hour for 1 to 2 days, depending on severity, to reduce swelling and itching.  · To lessen pain, a paste made of water and baking soda may be rubbed on the bite or sting and left on for 5 minutes.  · To relieve itching and swelling, you may use take medication or apply medicated creams or lotions as directed.  · Only take over-the-counter or prescription medicines for pain, discomfort, or fever as directed by your caregiver.  · Wash the sting site daily with soap and water. Apply antibiotic ointment on the sting site as directed.  · If you suffered a severe reaction:  ¨ If you did not require hospitalization, an adult will need to stay with you for 24 hours in case the symptoms return.  ¨ You may need to wear a medical bracelet or necklace stating the allergy.  ¨ You and your family need to learn when and how to use an anaphylaxis kit or epinephrine injection.  ¨ If you have had a severe reaction before, always carry your anaphylaxis kit with you.  SEEK MEDICAL CARE IF:   · None of the above helps within 2 to 3 days.  · The area becomes red, warm, tender, and swollen beyond the area of the bite or sting.  · You have an oral temperature above 102° F (38.9° C).  SEEK IMMEDIATE MEDICAL CARE IF:   You have symptoms of an allergic reaction which are:  · Wheezing.  · Difficulty breathing.  · Chest pain.  · Lightheadedness or fainting.  · Itchy, raised, red patches on the skin.  · Nausea, vomiting, cramping or diarrhea.  ANY OF THESE SYMPTOMS MAY REPRESENT A SERIOUS PROBLEM THAT IS AN EMERGENCY. Do not wait to see if the symptoms will go away. Get medical help right away. Call your local emergency services (911 in [place] DO NOT drive yourself to the hospital.  MAKE SURE YOU:   · Understand these instructions.  · Will watch your condition.  · Will get help right  away if you are not doing well or get worse.     This information is not intended to replace advice given to you by your health care provider. Make sure you discuss any questions you have with your health care provider.     Document Released: 12/18/2006 Document Revised: 03/11/2013 Document Reviewed: 06/04/2011  [employer] Interactive Patient Education ©2016 [employer] Inc.            OmPrompt Access Code: [email or web address]  Expires: 7/27/2017  9:38 AM    OmPrompt  A secure, online tool to manage your health information     [employer]’s OmPrompt® is a secure, online tool that connects you to your personalized health information from the privacy of your home -- day or night - making it very easy for you to manage your healthcare. Once the activation process is completed, you can even access your medical information using the OmPrompt ahmet, which is available for free in the Apple Ahmet store or Google Play store.     OmPrompt provides the following levels of access (as shown below):   My Chart Features   [facility] Doctor [facility]  Specialists [facility]  Urgent  Care Non-[facility]  Primary Care  Doctor   Email your healthcare team securely and privately 24/7 X X X    Manage appointments: schedule your next appointment; view details of past/upcoming appointments X      Request prescription refills. X      View recent personal medical records, including lab and immunizations X X X X   View health record, including health history, allergies, medications X X X X   Read reports about your outpatient visits, procedures, consult and ER notes X X X X   See your discharge summary, which is a recap of your hospital and/or ER visit that includes your diagnosis, lab results, and care plan. X X       How to register for OmPrompt:  1. Go to  [email or web address].  2. Click on the Sign Up Now box, which takes you to the New Member Sign Up page. You will need to provide the following information:  a. Enter your OmPrompt Access Code  exactly as it appears at the top of this page. (You will not need to use this code after you’ve completed the sign-up process. If you do not sign up before the expiration date, you must request a new code.)   b. Enter your date of birth.   c. Enter your home email address.   d. Click Submit, and follow the next screen’s instructions.  3. Create a [employer] ID. This will be your [employer] login ID and cannot be changed, so think of one that is secure and easy to remember.  4. Create a [employer] password. You can change your password at any time.  5. Enter your Password Reset Question and Answer. This can be used at a later time if you forget your password.   6. Enter your e-mail address. This allows you to receive e-mail notifications when new information is available in [employer].  7. Click Sign Up. You can now view your health information.    For assistance activating your [employer] account, call [phone]

## 2017-06-27 NOTE — PATIENT INSTRUCTIONS
Bee, Wasp, or Hornet Sting  Your caregiver has diagnosed you as having an insect sting. An insect sting appears as a red lump in the skin that sometimes has a tiny hole in the center, or it may have a stinger in the center of the wound. The most common stings are from wasps, hornets and bees.  Individuals have different reactions to insect stings.  · A normal reaction may cause pain, swelling, and redness around the sting site.  · A localized allergic reaction may cause swelling and redness that extends beyond the sting site.  · A large local reaction may continue to develop over the next 12 to 36 hours.  · On occasion, the reactions can be severe (anaphylactic reaction). An anaphylactic reaction may cause wheezing; difficulty breathing; chest pain; fainting; raised, itchy, red patches on the skin; a sick feeling to your stomach (nausea); vomiting; cramping; or diarrhea. If you have had an anaphylactic reaction to an insect sting in the past, you are more likely to have one again.  HOME CARE INSTRUCTIONS   · With bee stings, a small sac of poison is left in the wound. Brushing across this with something such as a credit card, or anything similar, will help remove this and decrease the amount of the reaction. This same procedure will not help a wasp sting as they do not leave behind a stinger and poison sac.  · Apply a cold compress for 10 to 20 minutes every hour for 1 to 2 days, depending on severity, to reduce swelling and itching.  · To lessen pain, a paste made of water and baking soda may be rubbed on the bite or sting and left on for 5 minutes.  · To relieve itching and swelling, you may use take medication or apply medicated creams or lotions as directed.  · Only take over-the-counter or prescription medicines for pain, discomfort, or fever as directed by your caregiver.  · Wash the sting site daily with soap and water. Apply antibiotic ointment on the sting site as directed.  · If you suffered a severe  reaction:  ¨ If you did not require hospitalization, an adult will need to stay with you for 24 hours in case the symptoms return.  ¨ You may need to wear a medical bracelet or necklace stating the allergy.  ¨ You and your family need to learn when and how to use an anaphylaxis kit or epinephrine injection.  ¨ If you have had a severe reaction before, always carry your anaphylaxis kit with you.  SEEK MEDICAL CARE IF:   · None of the above helps within 2 to 3 days.  · The area becomes red, warm, tender, and swollen beyond the area of the bite or sting.  · You have an oral temperature above 102° F (38.9° C).  SEEK IMMEDIATE MEDICAL CARE IF:   You have symptoms of an allergic reaction which are:  · Wheezing.  · Difficulty breathing.  · Chest pain.  · Lightheadedness or fainting.  · Itchy, raised, red patches on the skin.  · Nausea, vomiting, cramping or diarrhea.  ANY OF THESE SYMPTOMS MAY REPRESENT A SERIOUS PROBLEM THAT IS AN EMERGENCY. Do not wait to see if the symptoms will go away. Get medical help right away. Call your local emergency services (911 in [place] DO NOT drive yourself to the hospital.  MAKE SURE YOU:   · Understand these instructions.  · Will watch your condition.  · Will get help right away if you are not doing well or get worse.     This information is not intended to replace advice given to you by your health care provider. Make sure you discuss any questions you have with your health care provider.     Document Released: 12/18/2006 Document Revised: 03/11/2013 Document Reviewed: 06/04/2011  [employer] Interactive Patient Education ©2016 [employer]

## 2017-06-27 NOTE — PROGRESS NOTES
"Subjective:      Demarcus Salas is a 61 y.o. male who presents with Allergic Reaction            Allergic Reaction  This is a new problem. The current episode started yesterday. The problem occurs constantly. The problem has been rapidly worsening. Pertinent negatives include no chills, fever, nausea, visual change or vomiting.       Review of Systems   Constitutional: Negative for fever and chills.   Gastrointestinal: Negative for nausea and vomiting.     Allergies   Allergen Reactions   • Penicillins          Objective:     /78 mmHg  Pulse 84  Temp(Src) 37.1 °C (98.8 °F)  Resp 16  Ht 1.727 m (5' 7.99\")  Wt 74.844 kg (165 lb)  BMI 25.09 kg/m2  SpO2 97%     Physical Exam   Constitutional: He is oriented to person, place, and time. He appears well-developed and well-nourished. No distress.   HENT:   Head: Normocephalic and atraumatic.   Significant left facial swelling, edema, erythema, without fluctuance including left superficial neck.   Eyes: Conjunctivae and EOM are normal. Pupils are equal, round, and reactive to light.   Cardiovascular: Normal rate and regular rhythm.    No murmur heard.  Pulmonary/Chest: Effort normal and breath sounds normal. No respiratory distress.   Abdominal: Soft. He exhibits no distension. There is no tenderness.   Neurological: He is alert and oriented to person, place, and time. He has normal reflexes. No sensory deficit.   Skin: Skin is warm and dry.   Psychiatric: He has a normal mood and affect.               Assessment/Plan:     1. Wasp sting, accidental or unintentional, initial encounter  Differential diagnosis, natural history, supportive care, and indications for immediate follow-up discussed.   - EPINEPHrine (ADRENALIN) injection 0.5 mg; 0.5 mL by Intramuscular route Once.  - diphenhydrAMINE (BENADRYL) injection 25 mg; 0.5 mL by Intramuscular route Once.  - sulfamethoxazole-trimethoprim (BACTRIM DS) 800-160 MG tablet; Take 1 Tab by mouth 2 times a day for 10 days.  " Dispense: 20 Tab; Refill: 0  - cetirizine (ZYRTEC ALLERGY) 10 MG Tab; Take 1 Tab by mouth every day.  Dispense: 30 Tab; Refill: 0  - ranitidine (ZANTAC) 150 MG Tab; Take 1 Tab by mouth 2 times a day.  Dispense: 60 Tab; Refill: 0  - methylPREDNISolone sod succ (SOLU-MEDROL) 125 MG injection 125 mg; 2 mL by Intramuscular route Once.

## 2017-07-28 DIAGNOSIS — T63.461A WASP STING, ACCIDENTAL OR UNINTENTIONAL, INITIAL ENCOUNTER: ICD-10-CM

## 2017-07-28 RX ORDER — RANITIDINE 150 MG/1
150 TABLET ORAL 2 TIMES DAILY
Qty: 60 TAB | Refills: 0 | Status: SHIPPED | OUTPATIENT
Start: 2017-07-28 | End: 2018-01-07

## 2017-07-28 NOTE — TELEPHONE ENCOUNTER
Covering for Jan Brown this week. Received refill request for zantac.  Pt was seen in clinic recently and is taking this medication for Ponce's esophagus. Refill sent to pharmacy    Shar Looney M.D.

## 2018-01-07 ENCOUNTER — RESOLUTE PROFESSIONAL BILLING HOSPITAL PROF FEE (OUTPATIENT)
Dept: HOSPITALIST | Facility: MEDICAL CENTER | Age: 63
End: 2018-01-07
Payer: COMMERCIAL

## 2018-01-07 ENCOUNTER — APPOINTMENT (OUTPATIENT)
Dept: RADIOLOGY | Facility: MEDICAL CENTER | Age: 63
End: 2018-01-07
Attending: EMERGENCY MEDICINE
Payer: COMMERCIAL

## 2018-01-07 ENCOUNTER — HOSPITAL ENCOUNTER (OUTPATIENT)
Facility: MEDICAL CENTER | Age: 63
End: 2018-01-07
Attending: EMERGENCY MEDICINE | Admitting: INTERNAL MEDICINE
Payer: COMMERCIAL

## 2018-01-07 ENCOUNTER — APPOINTMENT (OUTPATIENT)
Dept: RADIOLOGY | Facility: MEDICAL CENTER | Age: 63
End: 2018-01-07
Attending: INTERNAL MEDICINE
Payer: COMMERCIAL

## 2018-01-07 VITALS
OXYGEN SATURATION: 95 % | HEIGHT: 68 IN | TEMPERATURE: 96.9 F | RESPIRATION RATE: 15 BRPM | HEART RATE: 67 BPM | BODY MASS INDEX: 26.36 KG/M2 | DIASTOLIC BLOOD PRESSURE: 93 MMHG | SYSTOLIC BLOOD PRESSURE: 138 MMHG | WEIGHT: 173.94 LBS

## 2018-01-07 DIAGNOSIS — R07.9 CHEST PAIN, UNSPECIFIED TYPE: ICD-10-CM

## 2018-01-07 LAB
ALBUMIN SERPL BCP-MCNC: 4.3 G/DL (ref 3.2–4.9)
ALBUMIN/GLOB SERPL: 1.3 G/DL
ALP SERPL-CCNC: 73 U/L (ref 30–99)
ALT SERPL-CCNC: 14 U/L (ref 2–50)
ANION GAP SERPL CALC-SCNC: 8 MMOL/L (ref 0–11.9)
APTT PPP: 30.7 SEC (ref 24.7–36)
AST SERPL-CCNC: 24 U/L (ref 12–45)
BASOPHILS # BLD AUTO: 0.9 % (ref 0–1.8)
BASOPHILS # BLD: 0.06 K/UL (ref 0–0.12)
BILIRUB SERPL-MCNC: 0.8 MG/DL (ref 0.1–1.5)
BNP SERPL-MCNC: 22 PG/ML (ref 0–100)
BUN SERPL-MCNC: 14 MG/DL (ref 8–22)
CALCIUM SERPL-MCNC: 10 MG/DL (ref 8.5–10.5)
CHLORIDE SERPL-SCNC: 101 MMOL/L (ref 96–112)
CO2 SERPL-SCNC: 27 MMOL/L (ref 20–33)
CREAT SERPL-MCNC: 0.82 MG/DL (ref 0.5–1.4)
EOSINOPHIL # BLD AUTO: 0.29 K/UL (ref 0–0.51)
EOSINOPHIL NFR BLD: 4.5 % (ref 0–6.9)
ERYTHROCYTE [DISTWIDTH] IN BLOOD BY AUTOMATED COUNT: 40 FL (ref 35.9–50)
GFR SERPL CREATININE-BSD FRML MDRD: >60 ML/MIN/1.73 M 2
GLOBULIN SER CALC-MCNC: 3.4 G/DL (ref 1.9–3.5)
GLUCOSE SERPL-MCNC: 88 MG/DL (ref 65–99)
HCT VFR BLD AUTO: 47 % (ref 42–52)
HGB BLD-MCNC: 16.7 G/DL (ref 14–18)
IMM GRANULOCYTES # BLD AUTO: 0.02 K/UL (ref 0–0.11)
IMM GRANULOCYTES NFR BLD AUTO: 0.3 % (ref 0–0.9)
INR PPP: 0.94 (ref 0.87–1.13)
LIPASE SERPL-CCNC: 21 U/L (ref 11–82)
LYMPHOCYTES # BLD AUTO: 2.06 K/UL (ref 1–4.8)
LYMPHOCYTES NFR BLD: 31.7 % (ref 22–41)
MCH RBC QN AUTO: 32.4 PG (ref 27–33)
MCHC RBC AUTO-ENTMCNC: 35.5 G/DL (ref 33.7–35.3)
MCV RBC AUTO: 91.1 FL (ref 81.4–97.8)
MONOCYTES # BLD AUTO: 1.11 K/UL (ref 0–0.85)
MONOCYTES NFR BLD AUTO: 17.1 % (ref 0–13.4)
NEUTROPHILS # BLD AUTO: 2.95 K/UL (ref 1.82–7.42)
NEUTROPHILS NFR BLD: 45.5 % (ref 44–72)
NRBC # BLD AUTO: 0 K/UL
NRBC BLD-RTO: 0 /100 WBC
PLATELET # BLD AUTO: 265 K/UL (ref 164–446)
PMV BLD AUTO: 10.2 FL (ref 9–12.9)
POTASSIUM SERPL-SCNC: 3.8 MMOL/L (ref 3.6–5.5)
PROT SERPL-MCNC: 7.7 G/DL (ref 6–8.2)
PROTHROMBIN TIME: 12.3 SEC (ref 12–14.6)
RBC # BLD AUTO: 5.16 M/UL (ref 4.7–6.1)
SODIUM SERPL-SCNC: 136 MMOL/L (ref 135–145)
TROPONIN I SERPL-MCNC: <0.01 NG/ML (ref 0–0.04)
WBC # BLD AUTO: 6.5 K/UL (ref 4.8–10.8)

## 2018-01-07 PROCEDURE — 36415 COLL VENOUS BLD VENIPUNCTURE: CPT

## 2018-01-07 PROCEDURE — 85610 PROTHROMBIN TIME: CPT

## 2018-01-07 PROCEDURE — G0378 HOSPITAL OBSERVATION PER HR: HCPCS

## 2018-01-07 PROCEDURE — 71045 X-RAY EXAM CHEST 1 VIEW: CPT

## 2018-01-07 PROCEDURE — 85025 COMPLETE CBC W/AUTO DIFF WBC: CPT

## 2018-01-07 PROCEDURE — A9502 TC99M TETROFOSMIN: HCPCS

## 2018-01-07 PROCEDURE — 99236 HOSP IP/OBS SAME DATE HI 85: CPT | Performed by: HOSPITALIST

## 2018-01-07 PROCEDURE — 83690 ASSAY OF LIPASE: CPT

## 2018-01-07 PROCEDURE — 85730 THROMBOPLASTIN TIME PARTIAL: CPT

## 2018-01-07 PROCEDURE — 99285 EMERGENCY DEPT VISIT HI MDM: CPT

## 2018-01-07 PROCEDURE — 93005 ELECTROCARDIOGRAM TRACING: CPT | Performed by: EMERGENCY MEDICINE

## 2018-01-07 PROCEDURE — 83880 ASSAY OF NATRIURETIC PEPTIDE: CPT

## 2018-01-07 PROCEDURE — 84484 ASSAY OF TROPONIN QUANT: CPT

## 2018-01-07 PROCEDURE — 80053 COMPREHEN METABOLIC PANEL: CPT

## 2018-01-07 RX ORDER — ONDANSETRON 4 MG/1
4 TABLET, ORALLY DISINTEGRATING ORAL EVERY 4 HOURS PRN
Status: DISCONTINUED | OUTPATIENT
Start: 2018-01-07 | End: 2018-01-07 | Stop reason: HOSPADM

## 2018-01-07 RX ORDER — OMEPRAZOLE 20 MG/1
20 CAPSULE, DELAYED RELEASE ORAL DAILY
Status: DISCONTINUED | OUTPATIENT
Start: 2018-01-07 | End: 2018-01-07 | Stop reason: HOSPADM

## 2018-01-07 RX ORDER — CHLORAL HYDRATE 500 MG
1000 CAPSULE ORAL
Status: DISCONTINUED | OUTPATIENT
Start: 2018-01-07 | End: 2018-01-07

## 2018-01-07 RX ORDER — PROMETHAZINE HYDROCHLORIDE 25 MG/1
12.5-25 SUPPOSITORY RECTAL EVERY 4 HOURS PRN
Status: DISCONTINUED | OUTPATIENT
Start: 2018-01-07 | End: 2018-01-07 | Stop reason: HOSPADM

## 2018-01-07 RX ORDER — ACETAMINOPHEN 325 MG/1
650 TABLET ORAL EVERY 6 HOURS PRN
Status: DISCONTINUED | OUTPATIENT
Start: 2018-01-07 | End: 2018-01-07 | Stop reason: HOSPADM

## 2018-01-07 RX ORDER — LISINOPRIL 10 MG/1
5 TABLET ORAL DAILY
Status: DISCONTINUED | OUTPATIENT
Start: 2018-01-07 | End: 2018-01-07 | Stop reason: HOSPADM

## 2018-01-07 RX ORDER — ASPIRIN 300 MG/1
300 SUPPOSITORY RECTAL DAILY
Status: DISCONTINUED | OUTPATIENT
Start: 2018-01-07 | End: 2018-01-07 | Stop reason: HOSPADM

## 2018-01-07 RX ORDER — CETIRIZINE HYDROCHLORIDE 10 MG/1
10 TABLET ORAL DAILY
Status: DISCONTINUED | OUTPATIENT
Start: 2018-01-07 | End: 2018-01-07 | Stop reason: HOSPADM

## 2018-01-07 RX ORDER — PROMETHAZINE HYDROCHLORIDE 25 MG/1
12.5-25 TABLET ORAL EVERY 4 HOURS PRN
Status: DISCONTINUED | OUTPATIENT
Start: 2018-01-07 | End: 2018-01-07 | Stop reason: HOSPADM

## 2018-01-07 RX ORDER — ONDANSETRON 2 MG/ML
4 INJECTION INTRAMUSCULAR; INTRAVENOUS EVERY 4 HOURS PRN
Status: DISCONTINUED | OUTPATIENT
Start: 2018-01-07 | End: 2018-01-07 | Stop reason: HOSPADM

## 2018-01-07 RX ORDER — THIAMINE MONONITRATE (VIT B1) 100 MG
100 TABLET ORAL DAILY
COMMUNITY

## 2018-01-07 RX ORDER — ASPIRIN 325 MG
325 TABLET ORAL DAILY
Status: DISCONTINUED | OUTPATIENT
Start: 2018-01-07 | End: 2018-01-07 | Stop reason: HOSPADM

## 2018-01-07 RX ORDER — ASPIRIN 81 MG/1
324 TABLET, CHEWABLE ORAL DAILY
Status: DISCONTINUED | OUTPATIENT
Start: 2018-01-07 | End: 2018-01-07 | Stop reason: HOSPADM

## 2018-01-07 ASSESSMENT — ENCOUNTER SYMPTOMS
NAUSEA: 0
ORTHOPNEA: 0
PALPITATIONS: 0
STRIDOR: 0
MYALGIAS: 0
BRUISES/BLEEDS EASILY: 0
DIAPHORESIS: 0
SHORTNESS OF BREATH: 0
VOMITING: 0
CHILLS: 0
SORE THROAT: 0
HEADACHES: 0
DIARRHEA: 0
HEARTBURN: 0
FEVER: 0
WEAKNESS: 0
COUGH: 0

## 2018-01-07 ASSESSMENT — LIFESTYLE VARIABLES
EVER FELT BAD OR GUILTY ABOUT YOUR DRINKING: NO
EVER_SMOKED: NEVER
CONSUMPTION TOTAL: NEGATIVE
HOW MANY TIMES IN THE PAST YEAR HAVE YOU HAD 5 OR MORE DRINKS IN A DAY: 0
ON A TYPICAL DAY WHEN YOU DRINK ALCOHOL HOW MANY DRINKS DO YOU HAVE: 1
EVER HAD A DRINK FIRST THING IN THE MORNING TO STEADY YOUR NERVES TO GET RID OF A HANGOVER: NO
AVERAGE NUMBER OF DAYS PER WEEK YOU HAVE A DRINK CONTAINING ALCOHOL: 7
TOTAL SCORE: 0
HAVE PEOPLE ANNOYED YOU BY CRITICIZING YOUR DRINKING: NO
TOTAL SCORE: 0
HAVE YOU EVER FELT YOU SHOULD CUT DOWN ON YOUR DRINKING: NO
TOTAL SCORE: 0
ALCOHOL_USE: YES

## 2018-01-07 ASSESSMENT — PATIENT HEALTH QUESTIONNAIRE - PHQ9
1. LITTLE INTEREST OR PLEASURE IN DOING THINGS: NOT AT ALL
SUM OF ALL RESPONSES TO PHQ QUESTIONS 1-9: 0
SUM OF ALL RESPONSES TO PHQ9 QUESTIONS 1 AND 2: 0
2. FEELING DOWN, DEPRESSED, IRRITABLE, OR HOPELESS: NOT AT ALL

## 2018-01-07 ASSESSMENT — PAIN SCALES - GENERAL
PAINLEVEL_OUTOF10: 1
PAINLEVEL_OUTOF10: 3

## 2018-01-07 NOTE — CONSULTS
Cardiology Consult Note:    Fede Ceballos  Date & Time note created:    1/7/2018   11:41 AM     Referring MD:  Dr. Mcrae    Patient ID:   Name:             Demarcus Salas     YOB: 1955  Age:                 62 y.o.  male   MRN:               [id number]                                                             Reason for Consult:      Chest pain, concern for ACS    History of Present Illness:    62-year-old male with no significant past medical history presents with 2 instances of chest pain yesterday lasting longer than hour. They were hard to describe reaching intensive approximate 6 out of 10 and resolved after an hour when he came to the ER and was given aspirin and likely nitroglycerin. This is now resolved. His EKG was unremarkable. His troponin was negative. He has no cardiac risk factors no premature family history.    Review of Systems:      Constitutional: Denies fevers, Denies weight changes  Eyes: Denies changes in vision, no eye pain  Ears/Nose/Throat/Mouth: Denies nasal congestion or sore throat   Cardiovascular: + chest pain, no palpitations   Respiratory: no shortness of breath , Denies cough  Gastrointestinal/Hepatic: Denies abdominal pain, nausea, vomiting, diarrhea, constipation or GI bleeding   Genitourinary: Denies dysuria or frequency  Musculoskeletal/Rheum: Denies  joint pain and swelling, noedema  Skin: Denies rash  Neurological: Denies headache, confusion, memory loss or focal weakness/parasthesias  Psychiatric: denies mood disorder   Endocrine: Maren thyroid problems  Heme/Oncology/Lymph Nodes: Denies enlarged lymph nodes, denies brusing or known bleeding disorder  All other systems were reviewed and are negative (AMA/CMS criteria)                Past Medical History:   Past Medical History:   Diagnosis Date   • Ponce esophagus    • Hypertension      Active Hospital Problems    Diagnosis   • Chest pain [R07.9]   • Essential hypertension [I10]   • Ponce's  esophagus with dysplasia [K22.719]       Past Surgical History:  Past Surgical History:   Procedure Laterality Date   • MENISCUS REPAIR      unkown side   • ROTATOR CUFF REPAIR Bilateral        Hospital Medications:  Current Facility-Administered Medications   Medication Dose   • cetirizine (ZYRTEC) tablet 10 mg  10 mg   • vitamin D (cholecalciferol) tablet 1,000 Units  1,000 Units   • lisinopril (PRINIVIL) 10 MG tablet 5 mg  5 mg   • omeprazole (PRILOSEC) capsule 20 mg  20 mg   • aspirin (ASA) tablet 325 mg  325 mg    Or   • aspirin (ASA) chewable tab 324 mg  324 mg    Or   • aspirin (ASA) suppository 300 mg  300 mg   • acetaminophen (TYLENOL) tablet 650 mg  650 mg   • ondansetron (ZOFRAN) syringe/vial injection 4 mg  4 mg   • ondansetron (ZOFRAN ODT) dispertab 4 mg  4 mg   • promethazine (PHENERGAN) tablet 12.5-25 mg  12.5-25 mg   • promethazine (PHENERGAN) suppository 12.5-25 mg  12.5-25 mg   • prochlorperazine (COMPAZINE) injection 5-10 mg  5-10 mg     Current Outpatient Prescriptions   Medication   • thiamine (THIAMINE) 100 MG Tab   • Acetylcysteine (NAC) 600 MG Cap   • Cholecalciferol (VITAMIN D) 2000 UNIT Tab   • Turmeric 500 MG Tab   • Milk Thistle 250 MG Cap   • vitamin e (VITAMIN E) 400 UNIT Cap   • lisinopril (PRINIVIL) 5 MG Tab         Current Outpatient Medications:    (Not in a hospital admission)    Medication Allergy:  Allergies   Allergen Reactions   • Penicillins      Childhood RXN       Family History:  Family History   Problem Relation Age of Onset   • Diabetes Mother    • Heart Failure Mother    • Sleep Apnea Mother    • Lung Cancer Father    • Lung Disease Father    • No Known Problems Brother    • No Known Problems Brother    • No Known Problems Son        Social History:  Social History     Social History   • Marital status:      Spouse name: N/A   • Number of children: N/A   • Years of education: N/A     Occupational History   • Not on file.     Social History Main Topics   • Smoking  "status: Never Smoker   • Smokeless tobacco: Never Used   • Alcohol use 12.6 oz/week     14 Glasses of wine, 7 Shots of liquor per week      Comment: everyday, 2-4 drinks/day   • Drug use: No   • Sexual activity: Yes     Partners: Female     Other Topics Concern   • Not on file     Social History Narrative    Patient is  with one adult son. 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  Blood pressure 135/84, pulse 66, temperature 35.9 °C (96.6 °F), resp. rate 16, height 1.727 m (5' 8\"), weight 77.1 kg (170 lb), SpO2 98 %.  Vitals:    01/07/18 0900 01/07/18 0930 01/07/18 1030 01/07/18 1100   BP:       Pulse: 72 72 67 66   Resp:       Temp:       SpO2: 98% 98% 96% 98%   Weight:       Height:         Oxygen Therapy:  Pulse Oximetry: 98 %, O2 (LPM): 2, O2 Delivery: Silicone Nasal Cannula    Constitutional:   Well developed, Well nourished, No acute distress  HENMT:  Normocephalic, Atraumatic, Oropharynx moist mucous membranes, No oral exudates, Nose normal.  No thyromegaly.  Eyes:  EOMI, Conjunctiva normal, No discharge.  Neck:  Normal range of motion, No cervical tenderness,  no JVD.  Cardiovascular:  Normal heart rate, Normal rhythm, No murmurs, No rubs, No gallops.   Extremitites with intact distal pulses, no cyanosis, or edema.  Lungs:  Normal breath sounds, breath sounds clear to auscultation bilaterally,  no crackles, no wheezing.   Abdomen: Bowel sounds normal, Soft, No tenderness, No guarding, No rebound, No masses, No hepatosplenomegaly.  Skin: Warm, Dry, No erythema, No rash, no induration.  Neurologic: Alert & oriented x 3, No focal deficits noted, cranial nerves II through X are grossly intact.  Psychiatric: Affect normal, Judgment normal, Mood normal.      MDM (Data Review):     Records reviewed and summarized in current documentation    Lab Data Review:  Recent Results (from the past 24 hour(s))   CBC " with Differential    Collection Time: 01/07/18  4:38 AM   Result Value Ref Range    WBC 6.5 4.8 - 10.8 K/uL    RBC 5.16 4.70 - 6.10 M/uL    Hemoglobin 16.7 14.0 - 18.0 g/dL    Hematocrit 47.0 42.0 - 52.0 %    MCV 91.1 81.4 - 97.8 fL    MCH 32.4 27.0 - 33.0 pg    MCHC 35.5 (H) 33.7 - 35.3 g/dL    RDW 40.0 35.9 - 50.0 fL    Platelet Count 265 164 - 446 K/uL    MPV 10.2 9.0 - 12.9 fL    Neutrophils-Polys 45.50 44.00 - 72.00 %    Lymphocytes 31.70 22.00 - 41.00 %    Monocytes 17.10 (H) 0.00 - 13.40 %    Eosinophils 4.50 0.00 - 6.90 %    Basophils 0.90 0.00 - 1.80 %    Immature Granulocytes 0.30 0.00 - 0.90 %    Nucleated RBC 0.00 /100 WBC    Neutrophils (Absolute) 2.95 1.82 - 7.42 K/uL    Lymphs (Absolute) 2.06 1.00 - 4.80 K/uL    Monos (Absolute) 1.11 (H) 0.00 - 0.85 K/uL    Eos (Absolute) 0.29 0.00 - 0.51 K/uL    Baso (Absolute) 0.06 0.00 - 0.12 K/uL    Immature Granulocytes (abs) 0.02 0.00 - 0.11 K/uL    NRBC (Absolute) 0.00 K/uL   Complete Metabolic Panel (CMP)    Collection Time: 01/07/18  4:38 AM   Result Value Ref Range    Sodium 136 135 - 145 mmol/L    Potassium 3.8 3.6 - 5.5 mmol/L    Chloride 101 96 - 112 mmol/L    Co2 27 20 - 33 mmol/L    Anion Gap 8.0 0.0 - 11.9    Glucose 88 65 - 99 mg/dL    Bun 14 8 - 22 mg/dL    Creatinine 0.82 0.50 - 1.40 mg/dL    Calcium 10.0 8.5 - 10.5 mg/dL    AST(SGOT) 24 12 - 45 U/L    ALT(SGPT) 14 2 - 50 U/L    Alkaline Phosphatase 73 30 - 99 U/L    Total Bilirubin 0.8 0.1 - 1.5 mg/dL    Albumin 4.3 3.2 - 4.9 g/dL    Total Protein 7.7 6.0 - 8.2 g/dL    Globulin 3.4 1.9 - 3.5 g/dL    A-G Ratio 1.3 g/dL   Btype Natriuretic Peptide (BNP)    Collection Time: 01/07/18  4:38 AM   Result Value Ref Range    B Natriuretic Peptide 22 0 - 100 pg/mL   Prothrombin Time (PT/INR)    Collection Time: 01/07/18  4:38 AM   Result Value Ref Range    PT 12.3 12.0 - 14.6 sec    INR 0.94 0.87 - 1.13   APTT    Collection Time: 01/07/18  4:38 AM   Result Value Ref Range    APTT 30.7 24.7 - 36.0 sec    Lipase    Collection Time: 18  4:38 AM   Result Value Ref Range    Lipase 21 11 - 82 U/L   Troponin STAT    Collection Time: 18  4:38 AM   Result Value Ref Range    Troponin I <0.01 0.00 - 0.04 ng/mL   ESTIMATED GFR    Collection Time: 18  4:38 AM   Result Value Ref Range    GFR If African American >60 >60 mL/min/1.73 m 2    GFR If Non African American >60 >60 mL/min/1.73 m 2   EKG (ER)    Collection Time: 18  4:39 AM   Result Value Ref Range    Report       [facility]    Test Date:  2018  Pt Name:    RAYO HURTADO                  Department: ER  MRN:        [id number]                      Room:        21  Gender:     Male                         Technician: [id number]  :        1955                   Requested By:CLAUDIA FAJARDO  Order #:    [id number]                    Reading MD:    Measurements  Intervals                                Axis  Rate:       62                           P:          37  SC:         148                          QRS:        35  QRSD:       96                           T:          11  QT:         412  QTc:        419    Interpretive Statements  SINUS RHYTHM  MINIMAL ST ELEVATION, LATERAL LEADS  No previous ECG available for comparison     Troponin in four (4) hours    Collection Time: 18  8:47 AM   Result Value Ref Range    Troponin I <0.01 0.00 - 0.04 ng/mL       Imaging/Procedures Review:    Chest Xray:  Reviewed    EKG:   As in HPI.     ECHO:  N/A    MDM (Assessment and Plan):     Active Hospital Problems    Diagnosis   • Chest pain [R07.9]   • Essential hypertension [I10]   • Ponce's esophagus with dysplasia [K22.719]      62-year-old male with atypical chest pain and negative EKG and negative biomarkers. He will be scheduled for a nuclear stress test today and if normal he can be discharged.    Thank for you allowing me to take part in your patient's care, please call should you have any questions or  would like to discuss this patient.

## 2018-01-07 NOTE — DISCHARGE SUMMARY
CHIEF COMPLAINT ON ADMISSION  Chief Complaint   Patient presents with   • Chest Pain       CODE STATUS  Full Code    HPI & HOSPITAL COURSE  62 y.o. male who presented 1/7/2018 with chest pain that woke  Him from sleep at 2am and was worse with laying on his stomach. He sat up and it was slightly better but persisted for over an hour. His pain slowly subsided after coming to the emergency department. He denies any  Pain with exertion, diaphoresis, nausea, headache, chest palpitations or rash. A 10 point review of systems is reviewed and otherwise negative. He denies any radiation of the pain.    He was ruled out for AMI.  The stress test was neg.  Now CP free.  He states he had changed his work out routine recently, missing his cardio stretches.  He thinks this may have been the cause of his pain.  At the time of this summary the patient was eating and drinking well, having BM's and was hemodynamically stable.    Therefore, he is discharged in good and stable condition with close outpatient follow-up.      DISCHARGE PROBLEM LIST  Active Problems:    Essential hypertension POA: Yes    Ponce's esophagus with dysplasia POA: Yes    Chest pain POA: Unknown  Resolved Problems:    * No resolved hospital problems. *      FOLLOW UP  Dr. NEIL Brown (PCP) 3-4 wks.      MEDICATIONS ON DISCHARGE   JosueDemarcus   Home Medication Instructions MAYNOR:[id number]    Printed on:01/07/18 2785   Medication Information                      Acetylcysteine (NAC) 600 MG Cap  Take 1 Cap by mouth every day.             Cholecalciferol (VITAMIN D) 2000 UNIT Tab  Take  by mouth every day.             lisinopril (PRINIVIL) 5 MG Tab  Take 5 mg by mouth every day.             Milk Thistle 250 MG Cap  Take 1 Cap by mouth every day.             thiamine (THIAMINE) 100 MG Tab  Take 100 mg by mouth every day.             Turmeric 500 MG Tab  Take 1 Tab by mouth every day.             vitamin e (VITAMIN E) 400 UNIT Cap  Take 400 Units by mouth every day.                  DIET  Regular diet.  No EtOH.    ACTIVITY  Gradual increase in activity.      PROCEDURES  NM-CARDIAC STRESS TEST   Final Result      DX-CHEST-PORTABLE (1 VIEW)   Final Result      No acute cardiopulmonary abnormality.            LABORATORY  Lab Results   Component Value Date/Time    SODIUM 136 01/07/2018 04:38 AM    POTASSIUM 3.8 01/07/2018 04:38 AM    CHLORIDE 101 01/07/2018 04:38 AM    CO2 27 01/07/2018 04:38 AM    GLUCOSE 88 01/07/2018 04:38 AM    BUN 14 01/07/2018 04:38 AM    CREATININE 0.82 01/07/2018 04:38 AM        Lab Results   Component Value Date/Time    WBC 6.5 01/07/2018 04:38 AM    HEMOGLOBIN 16.7 01/07/2018 04:38 AM    HEMATOCRIT 47.0 01/07/2018 04:38 AM    PLATELETCT 265 01/07/2018 04:38 AM

## 2018-01-07 NOTE — H&P
Hospital Medicine History and Physical    Date of Service  1/7/2018    Chief Complaint  Chief Complaint   Patient presents with   • Chest Pain       History of Presenting Illness  62 y.o. male who presented 1/7/2018 with chest pain that woke  Him from sleep at 2am and was worse with laying on his stomach. He sat up and it was slightly better but persisted for over an hour. His pain slowly subsided after coming to the emergency department. He denies any  Pain with exertion, diaphoresis, nausea, headache, chest palpitations or rash. A 10 point review of systems is reviewed and otherwise negative. He denies any radiation of the pain.    Primary Care Physician  NIXON Oconnell.    Code Status  Full    Review of Systems  Review of Systems   Constitutional: Negative for chills, diaphoresis and fever.   HENT: Negative for sore throat.    Respiratory: Negative for cough, shortness of breath and stridor.    Cardiovascular: Positive for chest pain. Negative for palpitations, orthopnea and leg swelling.   Gastrointestinal: Negative for diarrhea, heartburn, nausea and vomiting.   Genitourinary: Negative for dysuria and hematuria.   Musculoskeletal: Negative for myalgias.   Skin: Negative for itching and rash.   Neurological: Negative for weakness and headaches.   Endo/Heme/Allergies: Does not bruise/bleed easily.          Past Medical History  Past Medical History:   Diagnosis Date   • Ponce esophagus    • Hypertension        Surgical History  Past Surgical History:   Procedure Laterality Date   • MENISCUS REPAIR      unkown side   • ROTATOR CUFF REPAIR Bilateral        Medications    Current Facility-Administered Medications:   •  cetirizine (ZYRTEC) tablet 10 mg, 10 mg, Oral, DAILY, Emily Ann M.D.  •  vitamin D (cholecalciferol) tablet 1,000 Units, 1,000 Units, Oral, DAILY, Emily Ann M.D.  •  lisinopril (PRINIVIL) 10 MG tablet 5 mg, 5 mg, Oral, DAILY, Emily Ann M.D.  •  fish oil capsule 1,000 mg,  1,000 mg, Oral, TID WITH MEALS, Emily Ann M.D.  •  omeprazole (PRILOSEC) capsule 20 mg, 20 mg, Oral, DAILY, Emily Ann M.D.  •  aspirin (ASA) tablet 325 mg, 325 mg, Oral, DAILY **OR** aspirin (ASA) chewable tab 324 mg, 324 mg, Oral, DAILY **OR** aspirin (ASA) suppository 300 mg, 300 mg, Rectal, DAILY, Emily Ann M.D.  •  acetaminophen (TYLENOL) tablet 650 mg, 650 mg, Oral, Q6HRS PRN, Emily Ann M.D.  •  ondansetron (ZOFRAN) syringe/vial injection 4 mg, 4 mg, Intravenous, Q4HRS PRN, Emily Ann M.D.  •  ondansetron (ZOFRAN ODT) dispertab 4 mg, 4 mg, Oral, Q4HRS PRN, Emily Ann M.D.  •  promethazine (PHENERGAN) tablet 12.5-25 mg, 12.5-25 mg, Oral, Q4HRS PRN, Emily Ann M.D.  •  promethazine (PHENERGAN) suppository 12.5-25 mg, 12.5-25 mg, Rectal, Q4HRS PRN, Emily Ann M.D.  •  prochlorperazine (COMPAZINE) injection 5-10 mg, 5-10 mg, Intravenous, Q4HRS PRN, Emily Ann M.D.    Current Outpatient Prescriptions:   •  ranitidine (ZANTAC) 150 MG Tab, Take 1 Tab by mouth 2 times a day., Disp: 60 Tab, Rfl: 0  •  cetirizine (ZYRTEC ALLERGY) 10 MG Tab, Take 1 Tab by mouth every day., Disp: 30 Tab, Rfl: 0  •  Omega-3 Fatty Acids (FISH OIL) 1000 MG Cap capsule, Take 1,000 mg by mouth 3 times a day, with meals., Disp: , Rfl:   •  Acetylcysteine (NAC) 600 MG Cap, Take  by mouth., Disp: , Rfl:   •  Cholecalciferol (VITAMIN D) 2000 UNIT Tab, Take  by mouth every day., Disp: , Rfl:   •  Turmeric 500 MG Tab, Take  by mouth., Disp: , Rfl:   •  Milk Thistle 250 MG Cap, Take  by mouth., Disp: , Rfl:   •  vitamin e (VITAMIN E) 400 UNIT Cap, Take 400 Units by mouth every day., Disp: , Rfl:   •  lisinopril-hydrochlorothiazide (PRINZIDE, ZESTORETIC) 20-25 MG per tablet, Take 1 Tab by mouth every day., Disp: 90 Tab, Rfl: 3  •  lisinopril (PRINIVIL) 5 MG Tab, Take 5 mg by mouth every day., Disp: , Rfl:   •  omeprazole (PRILOSEC) 20 MG CPDR, Take 20 mg by mouth every day.  , Disp: , Rfl:   •  NON  SPECIFIED, Blood pressure Medication , Disp: , Rfl:   •  naproxen (NAPROSYN) 500 MG TABS, Take 1 Tab by mouth 2 times a day, with meals., Disp: 30 Tab, Rfl: 0    Family History  Family History   Problem Relation Age of Onset   • Diabetes Mother    • Heart Failure Mother    • Sleep Apnea Mother    • Lung Cancer Father    • Lung Disease Father    • No Known Problems Brother    • No Known Problems Brother    • No Known Problems Son        Social History  Social History   Substance Use Topics   • Smoking status: Never Smoker   • Smokeless tobacco: Never Used   • Alcohol use 12.6 oz/week     14 Glasses of wine, 7 Shots of liquor per week      Comment: everyday, 2-4 drinks/day   1 bottle of wine nightly    Allergies  Allergies   Allergen Reactions   • Penicillins         Physical Exam  Laboratory   Hemodynamics  Temp (24hrs), Av.9 °C (96.6 °F), Min:35.9 °C (96.6 °F), Max:35.9 °C (96.6 °F)   Temperature: 35.9 °C (96.6 °F)  Pulse  Av.8  Min: 63  Max: 73 Heart Rate (Monitored): 69  Blood Pressure: 135/84, NIBP: 134/82      Respiratory      Respiration: 16, Pulse Oximetry: 97 %             Physical Exam   Constitutional: He is oriented to person, place, and time. No distress.   HENT:   Mouth/Throat: Oropharynx is clear and moist.   Eyes: Conjunctivae are normal.   Neck: Neck supple.   Cardiovascular: Normal rate and regular rhythm.    Pulmonary/Chest: No respiratory distress. He has no wheezes. He has no rales.   Abdominal: Soft. Bowel sounds are normal.   Musculoskeletal: He exhibits no edema.   Neurological: He is alert and oriented to person, place, and time. Coordination normal.   Skin: Skin is warm and dry. No rash noted. He is not diaphoretic.   Psychiatric: His behavior is normal.   Nursing note and vitals reviewed.      Recent Labs      18   0438   WBC  6.5   RBC  5.16   HEMOGLOBIN  16.7   HEMATOCRIT  47.0   MCV  91.1   MCH  32.4   MCHC  35.5*   RDW  40.0   PLATELETCT  265   MPV  10.2     Recent Labs       01/07/18 0438   SODIUM  136   POTASSIUM  3.8   CHLORIDE  101   CO2  27   GLUCOSE  88   BUN  14   CREATININE  0.82   CALCIUM  10.0     Recent Labs      01/07/18 0438   ALTSGPT  14   ASTSGOT  24   ALKPHOSPHAT  73   TBILIRUBIN  0.8   LIPASE  21   GLUCOSE  88     Recent Labs      01/07/18 0438   APTT  30.7   INR  0.94     Recent Labs      01/07/18 0438   BNPBTYPENAT  22           Imaging  EKG reviewed by  Myself shows sinus rhythm with no ST segment deviation or t wave inversions   Assessment/Plan     I anticipate this patient is appropriate for observation status at this time.    Chest pain   Assessment & Plan    Concerning for possible cardiac etiology vs gastric as it was worse with laying on his stomach and alleviated with sitting up over time  He will be monitored on telemetry, I will check serial troponin enzymes and a cardiac stress test to rule out inducible ischemia.        Ponce's esophagus with dysplasia- (present on admission)   Assessment & Plan    Will order omeprazole to treat        Essential hypertension- (present on admission)   Assessment & Plan    Continue lisinopril and monitor blood pressure            VTE prophylaxis: ambulatory patient.

## 2018-01-07 NOTE — ASSESSMENT & PLAN NOTE
Concerning for possible cardiac etiology vs gastric as it was worse with laying on his stomach and alleviated with sitting up over time  He will be monitored on telemetry, I will check serial troponin enzymes and a cardiac stress test to rule out inducible ischemia.

## 2018-01-07 NOTE — ED PROVIDER NOTES
ED Provider Note    CHIEF COMPLAINT  Chief Complaint   Patient presents with   • Chest Pain       HPI  Demarcus Salas is a 62 y.o. male who presents with chest pain, 3 AM tonight, moderate pain, woke him up, sudden onset, no radiationor shortness breath no diaphoresis no history of similar episodes. Never gets chest pain with exercise. Pain is still there when he arrived here but now mild. Pain was moderate to begin with. Paramedics gave him aspirin and nitroglycerin. Nitroglycerin improved his pain. No episodes similar to this in the past. No other modifiers    REVIEW OF SYSTEMS  See HPI for further details. History of Ponce's esophagus, hypertensionDenies other G.I., G.U.. endrocine, cardiovascular, respriatory or neurological problems.  All other systems are negative.     PAST MEDICAL HISTORY  Past Medical History:   Diagnosis Date   • Ponce esophagus    • Hypertension        FAMILY HISTORY  Family History   Problem Relation Age of Onset   • Diabetes Mother    • Heart Failure Mother    • Sleep Apnea Mother    • Lung Cancer Father    • Lung Disease Father    • No Known Problems Brother    • No Known Problems Brother    • No Known Problems Son        SOCIAL HISTORY  Social History     Social History   • Marital status:      Spouse name: N/A   • Number of children: N/A   • Years of education: N/A     Social History Main Topics   • Smoking status: Never Smoker   • Smokeless tobacco: Never Used   • Alcohol use 12.6 oz/week     14 Glasses of wine, 7 Shots of liquor per week      Comment: everyday, 2-4 drinks/day   • Drug use: No   • Sexual activity: Yes     Partners: Female     Other Topics Concern   • Not on file     Social History Narrative    Patient is  with one adult son. 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 (Registered Nurse) on 01/07/18 at 0444  Med List Status: Partial   Medication Last Dose Status   Acetylcysteine (NAC) 600 MG Cap  Active   cetirizine (ZYRTEC ALLERGY) 10 MG Tab  Active   Cholecalciferol (VITAMIN D) 2000 UNIT Tab  Active   lisinopril (PRINIVIL) 5 MG Tab  Active   lisinopril-hydrochlorothiazide (PRINZIDE, ZESTORETIC) 20-25 MG per tablet  Active   Milk Thistle 250 MG Cap  Active   naproxen (NAPROSYN) 500 MG TABS  Active   NON SPECIFIED  Active   Omega-3 Fatty Acids (FISH OIL) 1000 MG Cap capsule  Active   omeprazole (PRILOSEC) 20 MG CPDR  Active   ranitidine (ZANTAC) 150 MG Tab  Active   Turmeric 500 MG Tab  Active   vitamin e (VITAMIN E) 400 UNIT Cap  Active                ALLERGIES  Allergies   Allergen Reactions   • Penicillins        PHYSICAL EXAM  VITAL SIGNS: /84   Pulse 73   Temp 35.9 °C (96.6 °F)   Resp 16   Ht 1.727 m (5' 8\")   Wt 77.1 kg (170 lb)   SpO2 96%   BMI 25.85 kg/m²    Constitutional: Well developed, Well nourished, No acute distress, Non-toxic appearance.   HENT: Normocephalic, Atraumatic, Bilateral external ears normal, Oropharynx moist, No oral exudates, Nose normal.   Eyes: PERRL, EOMI, Conjunctiva normal, No discharge.   Neck: Normal range of motion, No tenderness, Supple, No stridor.   Lymphatic: No lymphadenopathy noted.   Cardiovascular: Normal heart rate, Normal rhythm, No murmurs, No rubs, No gallops.   Thorax & Lungs: Normal breath sounds, No respiratory distress, No wheezing, No chest tenderness.   Abdomen:  No tenderness, no guarding no rigidity and the abdomen is soft.  No masses, No pulsatile masses.  Skin: Warm, Dry, No erythema, No rash.   Back: No tenderness, No CVA tenderness.   Extremities: Intact distal pulses, No edema, No tenderness, No cyanosis, No clubbing.   Musculoskeletal: Good range of motion in all major joints. No tenderness to palpation or major deformities noted.   Neurologic: Alert & oriented " x 3, Normal motor function, Normal sensory function, No focal deficits noted.   Psychiatric: Affect normal, Judgment normal, Mood normal.   EKG Interpretation    Interpreted by me    Rhythm: normal sinus   Rate: normal  Axis: normal  Ectopy: none  Conduction: normal  ST Segments: Normal ST elevation in the inferior leads. Minimal ST elevation in the lateral leads condition.     Q waves in lead 3.  T Waves: no acute change  Q Waves: none    Clinical Impression: I do not have an old EKG to compare to      RADIOLOGY/PROCEDURES  DX-CHEST-PORTABLE (1 VIEW)   Final Result      No acute cardiopulmonary abnormality.              COURSE & MEDICAL DECISION MAKING  Pertinent Labs & Imaging studies reviewed. (See chart for details) white count normal hematocrit and normal platelet count normal there is no shift, electrolytes normal renal function normal liver function normal, clotting studies normal. Troponin normal, BNP is normal    He is having chest pain, partially relieved with nitroglycerin. . Given aspirin by paramedics No history of heart disease in the past. I will talk with hospitalist about admission.  FINAL IMPRESSION  1.   1. Chest pain, unspecified type        2.   3.     Disposition  I have talked with hospitalist about admission.  Electronically signed by: George Mcrae, 1/7/2018 4:49 AM

## 2018-01-07 NOTE — ED NOTES
"Chief Complaint   Patient presents with   • Chest Pain     Blood pressure 135/84, pulse 73, temperature 35.9 °C (96.6 °F), resp. rate 16, height 1.727 m (5' 8\"), weight 77.1 kg (170 lb), SpO2 96 %.    Pt BIB EMS for above complaint    Per EMS report, pt started c/o sudden onset CP, non radiating at 0300 this morning. Pt c/o 4/10 pain that feels better while lying on L side. Pt was given 1 asa tab and 1 nitro SL en route. After nitro tab, pt HR dropped to 38, o2 sats dropped to 88 on RA, and BP dropped. Pt VSS at this time.     ERP at bedside.   "

## 2018-01-07 NOTE — PROGRESS NOTES
Pt arrived to unit via wheelchair at 1250. Pt oriented to room, unit, and plan of care. Tele-monitor placed, SR, 67.Admit profile and assessment profile complete; Denies pain at this time, Pending stress test; Declines vaccines; All questions answered at this time. Call light within reach; fall precautions in place.

## 2018-01-08 NOTE — DISCHARGE INSTRUCTIONS
Discharge Instructions    Discharged to home by car with relative. Discharged via walking, hospital escort: Yes.  Special equipment needed: Not Applicable    Be sure to schedule a follow-up appointment with your primary care doctor or any specialists as instructed.     Discharge Plan:   Diet Plan: Discussed  Activity Level: Discussed  Confirmed Follow up Appointment: Patient to Call and Schedule Appointment  Confirmed Symptoms Management: Discussed  Medication Reconciliation Updated: Yes  Influenza Vaccine Indication: Patient Refuses    I understand that a diet low in cholesterol, fat, and sodium is recommended for good health. Unless I have been given specific instructions below for another diet, I accept this instruction as my diet prescription.   Other diet: Heart Healthy    Special Instructions: None    · Is patient discharged on Warfarin / Coumadin?   No     · Is patient Post Blood Transfusion?  No    Depression / Suicide Risk    As you are discharged from this [facility] facility, it is important to learn how to keep safe from harming yourself.    Recognize the warning signs:  · Abrupt changes in personality, positive or negative- including increase in energy   · Giving away possessions  · Change in eating patterns- significant weight changes-  positive or negative  · Change in sleeping patterns- unable to sleep or sleeping all the time   · Unwillingness or inability to communicate  · Depression  · Unusual sadness, discouragement and loneliness  · Talk of wanting to die  · Neglect of personal appearance   · Rebelliousness- reckless behavior  · Withdrawal from people/activities they love  · Confusion- inability to concentrate     If you or a loved one observes any of these behaviors or has concerns about self-harm, here's what you can do:  · Talk about it- your feelings and reasons for harming yourself  · Remove any means that you might use to hurt yourself (examples: pills, rope, extension cords,  firearm)  · Get professional help from the community (Mental Health, Substance Abuse, psychological counseling)  · Do not be alone:Call your Safe Contact- someone whom you trust who will be there for you.  · Call your local CRISIS HOTLINE 861-4375 or 916-172-6332  · Call your local Children's Mobile Crisis Response Team [place] (681) 537-1344 or www.hiogi  · Call the toll free National Suicide Prevention Hotlines   · National Suicide Prevention Lifeline 648-755-PUXH (4383)  · [facility] 800-SUICIDE (359-6229)      Chest Pain Observation  It is often hard to give a specific diagnosis for the cause of chest pain. Among other possibilities your symptoms might be caused by inadequate oxygen delivery to your heart (angina). Angina that is not treated or evaluated can lead to a heart attack (myocardial infarction) or death.  Blood tests, electrocardiograms, and X-rays may have been done to help determine a possible cause of your chest pain. After evaluation and observation, your health care provider has determined that it is unlikely your pain was caused by an unstable condition that requires hospitalization. However, a full evaluation of your pain may need to be completed, with additional diagnostic testing as directed. It is very important to keep your follow-up appointments. Not keeping your follow-up appointments could result in permanent heart damage, disability, or death. If there is any problem keeping your follow-up appointments, you must call your health care provider.  HOME CARE INSTRUCTIONS   Due to the slight chance that your pain could be angina, it is important to follow your health care provider's treatment plan and also maintain a healthy lifestyle:  · Maintain or work toward achieving a healthy weight.  · Stay physically active and exercise regularly.  · Decrease your salt intake.  · Eat a balanced, healthy diet. Talk to a dietitian to learn about heart-healthy  foods.  · Increase your fiber intake by including whole grains, vegetables, fruits, and nuts in your diet.  · Avoid situations that cause stress, anger, or depression.  · Take medicines as advised by your health care provider. Report any side effects to your health care provider. Do not stop medicines or adjust the dosages on your own.  · Quit smoking. Do not use nicotine patches or gum until you check with your health care provider.  · Keep your blood pressure, blood sugar, and cholesterol levels within normal limits.  · Limit alcohol intake to no more than 1 drink per day for women who are not pregnant and 2 drinks per day for men.  · Do not abuse drugs.  SEEK IMMEDIATE MEDICAL CARE IF:  You have severe chest pain or pressure which may include symptoms such as:  · You feel pain or pressure in your arms, neck, jaw, or back.  · You have severe back or abdominal pain, feel sick to your stomach (nauseous), or throw up (vomit).  · You are sweating profusely.  · You are having a fast or irregular heartbeat.  · You feel short of breath while at rest.  · You notice increasing shortness of breath during rest, sleep, or with activity.  · You have chest pain that does not get better after rest or after taking your usual medicine.  · You wake from sleep with chest pain.  · You are unable to sleep because you cannot breathe.  · You develop a frequent cough or you are coughing up blood.  · You feel dizzy, faint, or experience extreme fatigue.  · You develop severe weakness, dizziness, fainting, or chills.  Any of these symptoms may represent a serious problem that is an emergency. Do not wait to see if the symptoms will go away. Call your local emergency services (911 in the [place] Do not drive yourself to the hospital.  MAKE SURE YOU:  · Understand these instructions.  · Will watch your condition.  · Will get help right away if you are not doing well or get worse.     This information is not intended to replace advice given to  you by your health care provider. Make sure you discuss any questions you have with your health care provider.     Document Released: 01/20/2012 Document Revised: 12/23/2014 Document Reviewed: 06/19/2014  Elsevier Interactive Patient Education ©2016 [employer]

## 2018-01-16 LAB — EKG IMPRESSION: NORMAL

## 2018-02-27 NOTE — PROGRESS NOTES
Chief Complaint   Patient presents with   • New Patient     establish care, HTN       HPI:    Demarcus Salas is a 61 y.o. male here to establish care. He was previously a patient of Dr. Sidney Bella.    Ponce's esophagus with dysplasia  Long-standing problem that patient has been managing with daily Prilosec. He recently had EGD completed with [facility] this year and was told that he should be on daily PPI therapy although patient is unsure that he wants to do this.    Witnessed apneic spells  For the past couple of years patient reports that his wife has been concerned about him waking up in the middle of the night gasping for air. It is worse when he has been drinking alcohol. His mother has a history of sleep apnea. He would like to be checked for sleep apnea. Denies any daytime fatigue.      Essential hypertension  Long-standing problem that has been well-controlled with lisinopril-hydrochlorothiazide 20-25 mg daily. Denies any headache dizziness.does not monitor blood pressure at home        Current medicines (including changes today)  Current Outpatient Prescriptions   Medication Sig Dispense Refill   • Omega-3 Fatty Acids (FISH OIL) 1000 MG Cap capsule Take 1,000 mg by mouth 3 times a day, with meals.     • Acetylcysteine (NAC) 600 MG Cap Take  by mouth.     • Cholecalciferol (VITAMIN D) 2000 UNIT Tab Take  by mouth every day.     • Turmeric 500 MG Tab Take  by mouth.     • Milk Thistle 250 MG Cap Take  by mouth.     • vitamin e (VITAMIN E) 400 UNIT Cap Take 400 Units by mouth every day.     • [START ON 3/20/2017] lisinopril-hydrochlorothiazide (PRINZIDE, ZESTORETIC) 20-25 MG per tablet Take 1 Tab by mouth every day. 90 Tab 3   • lisinopril (PRINIVIL) 5 MG Tab Take 5 mg by mouth every day.     • omeprazole (PRILOSEC) 20 MG CPDR Take 20 mg by mouth every day.       • NON SPECIFIED Blood pressure Medication      • naproxen (NAPROSYN) 500 MG TABS Take 1 Tab by mouth 2 times a day, with meals. 30  Father was just diagnosed with the flu and Mell would like to know what the warning signs would be to look out for. Mell was advised that Dr. Green will be back in the office tomorrow and will receive a call back then and she was fine with that.    "Tab 0     No current facility-administered medications for this visit.     He  has a past medical history of Hypertension and Ponce esophagus.  He  has past surgical history that includes rotator cuff repair (Bilateral) and meniscus repair.  Social History   Substance Use Topics   • Smoking status: Never Smoker    • Smokeless tobacco: Never Used   • Alcohol Use: 12.6 oz/week     14 Glasses of wine, 7 Shots of liquor per week     Social History     Social History Narrative    Patient is  with one adult son. 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     Family History   Problem Relation Age of Onset   • Diabetes Mother    • Heart Failure Mother    • Sleep Apnea Mother    • Lung Cancer Father    • Lung Disease Father    • No Known Problems Brother    • No Known Problems Brother    • No Known Problems Son      Family Status   Relation Status Death Age   • Mother     • Father     • Brother Alive    • Brother Alive    • Son Alive      Health Maintenance Topics with due status: Overdue       Topic Date Due    IMM DTaP/Tdap/Td Vaccine 10/06/1975    COLONOSCOPY 10/06/2006    IMM INFLUENZA 2016    IMM ZOSTER VACCINE 10/06/2016        ROS  See form completed by patient, reviewed by me and no changes necessary. Scanned into chart.   Pertinent positives:  HEENT: +wears glasses  Pulm: +chronic cough  CV: +HTN, +PND  GI: +heartburn sx  All other systems reviewed by me and are negative.      Objective:     Blood pressure 130/72, pulse 100, temperature 36.2 °C (97.2 °F), resp. rate 12, height 1.727 m (5' 8\"), weight 78.926 kg (174 lb), SpO2 94 %. Body mass index is 26.46 kg/(m^2).  Physical Exam:  Alert, oriented in no acute distress.  Eye contact is good, speech goal directed, affect bright.  HEENT: EOMI, PERRL, conjunctiva non-injected, sclera non-icteric.  Neck: supple with no cervical or supraclavicular lymphadenopathy, palpable thyroid nodules or thyromegaly.  Lungs: unlabored, clear to " auscultation bilaterally with good excursion.  CV: regular rate and rhythm, no murmurs, no carotid bruits. DP pulses 2+ bilat.  Lower extremities color normal, vascularity normal, no edema, temperature normal  Neuro: Gait steady. Strength all extremities 5/5.         Assessment and Plan:   The following treatment plan was discussed  1. Encounter to establish care     2. Essential hypertension  controlled on current meds  lisinopril-hydrochlorothiazide (PRINZIDE, ZESTORETIC) 20-25 MG per tablet    COMP METABOLIC PANEL    TSH   3. Ponce's esophagus with dysplasia  stable. Continue follow-up with GI. educated patient on risk of not maintaining chronic PPI use.    4. Witnessed apneic spells  REFERRAL TO SLEEP STUDIES   5. Screening for prostate cancer  PROSTATE SPECIFIC AG SCREENING   6. Routine health maintenance  CBC WITH DIFFERENTIAL    TSH    VITAMIN D,25 HYDROXY   7. Screening for lipid disorders  LIPID PROFILE     [facility] Ponce's.   Followup: Return in about 1 year (around 3/17/2018). sooner should new symptoms or problems arise.

## 2018-04-07 DIAGNOSIS — I10 ESSENTIAL HYPERTENSION: ICD-10-CM

## 2018-04-09 RX ORDER — LISINOPRIL AND HYDROCHLOROTHIAZIDE 25; 20 MG/1; MG/1
1 TABLET ORAL
Qty: 90 TAB | Refills: 0 | OUTPATIENT
Start: 2018-04-09

## 2018-04-09 NOTE — TELEPHONE ENCOUNTER
Was the patient seen in the last year in this department? Yes lov 06/27/17    Does patient have an active prescription for medications requested? No     Received Request Via: Pharmacy

## 2018-04-23 ENCOUNTER — TELEPHONE (OUTPATIENT)
Dept: MEDICAL GROUP | Facility: PHYSICIAN GROUP | Age: 63
End: 2018-04-23

## 2018-04-23 NOTE — TELEPHONE ENCOUNTER
PLEASE DISREGARD/ MEDICATION REFILLED ON 4/25/18    Was the patient seen in the last year in this department? Yes     Does patient have an active prescription for medications requested? Yes     Received Request Via: Pharmacy

## 2018-04-23 NOTE — TELEPHONE ENCOUNTER
----- Message from Ana Luisa Rodriguez sent at 4/23/2018 11:52 AM PDT -----  Regarding: Refill   Contact: [phone]  Pt Came in Requesting Jan Brown to Refill his BP Meds Linsopril   Pharmacy [facility] [place] [facility]   Please Call Pt, He will run out tomorrow.

## 2018-04-25 RX ORDER — LISINOPRIL 5 MG/1
5 TABLET ORAL DAILY
Qty: 90 TAB | Refills: 3 | Status: SHIPPED | OUTPATIENT
Start: 2018-04-25 | End: 2018-05-09

## 2018-04-25 NOTE — TELEPHONE ENCOUNTER
Was the patient seen in the last year in this department? No   Patient last seen in office on 04/04/  Does patient have an active prescription for medications requested? No     Received Request Via: Patient

## 2018-05-09 ENCOUNTER — OFFICE VISIT (OUTPATIENT)
Dept: MEDICAL GROUP | Facility: PHYSICIAN GROUP | Age: 63
End: 2018-05-09
Payer: COMMERCIAL

## 2018-05-09 VITALS
HEART RATE: 71 BPM | BODY MASS INDEX: 28.09 KG/M2 | TEMPERATURE: 97.4 F | DIASTOLIC BLOOD PRESSURE: 86 MMHG | OXYGEN SATURATION: 96 % | WEIGHT: 179 LBS | SYSTOLIC BLOOD PRESSURE: 130 MMHG | HEIGHT: 67 IN

## 2018-05-09 DIAGNOSIS — N52.8 OTHER MALE ERECTILE DYSFUNCTION: ICD-10-CM

## 2018-05-09 DIAGNOSIS — K22.719 BARRETT'S ESOPHAGUS WITH DYSPLASIA: ICD-10-CM

## 2018-05-09 DIAGNOSIS — Z12.5 SCREENING FOR PROSTATE CANCER: ICD-10-CM

## 2018-05-09 DIAGNOSIS — Z00.00 WELL ADULT EXAM: ICD-10-CM

## 2018-05-09 DIAGNOSIS — I10 ESSENTIAL HYPERTENSION: ICD-10-CM

## 2018-05-09 PROBLEM — K26.9 DUODENAL ULCER: Status: RESOLVED | Noted: 2017-04-13 | Resolved: 2018-05-09

## 2018-05-09 PROBLEM — R07.9 CHEST PAIN: Status: RESOLVED | Noted: 2018-01-07 | Resolved: 2018-05-09

## 2018-05-09 PROBLEM — R06.81 WITNESSED APNEIC SPELLS: Status: RESOLVED | Noted: 2017-03-17 | Resolved: 2018-05-09

## 2018-05-09 PROCEDURE — 99396 PREV VISIT EST AGE 40-64: CPT | Performed by: NURSE PRACTITIONER

## 2018-05-09 RX ORDER — LISINOPRIL AND HYDROCHLOROTHIAZIDE 20; 12.5 MG/1; MG/1
1 TABLET ORAL DAILY
Qty: 90 TAB | Refills: 3 | Status: SHIPPED | OUTPATIENT
Start: 2018-05-09 | End: 2019-05-16 | Stop reason: SDUPTHER

## 2018-05-09 ASSESSMENT — PATIENT HEALTH QUESTIONNAIRE - PHQ9: CLINICAL INTERPRETATION OF PHQ2 SCORE: 0

## 2018-05-09 NOTE — ASSESSMENT & PLAN NOTE
This is a new developing problem. Not always occurring. It is situational dependent. bp is well controlled. No other significant contributing factors or medications that would be responsible.

## 2018-05-09 NOTE — LETTER
[facility]  ANNAMARIA Oconnell  [address]  [place] [address]  Fax: [phone]   Authorization for Release/Disclosure of   Protected Health Information   Name: RAYO SALAS : 1955 SSN: [id number]   Address: [facility] [place] [address] Phone:    [phone] (home)    I authorize the entity listed below to release/disclose the PHI below to:   [facility]/ANNAMARIA Oconnell and ANNAMARIA Oconnell   Provider or Entity Name:  [facility]   Address   City, State, Zip   Phone:      Fax:     Reason for request: continuity of care   Information to be released:    [  ] LAST COLONOSCOPY,  including any PATH REPORT and follow-up  [  ] LAST FIT/COLOGUARD RESULT [  ] LAST DEXA  [  ] LAST MAMMOGRAM  [  ] LAST PAP  [  ] LAST LABS [  ] RETINA EXAM REPORT  [  ] IMMUNIZATION RECORDS  [  ] Release all info      [  ] Check here and initial the line next to each item to release ALL health information INCLUDING  _____ Care and treatment for drug and / or alcohol abuse  _____ HIV testing, infection status, or AIDS  _____ Genetic Testing    DATES OF SERVICE OR TIME PERIOD TO BE DISCLOSED: _____________  I understand and acknowledge that:  * This Authorization may be revoked at any time by you in writing, except if your health information has already been used or disclosed.  * Your health information that will be used or disclosed as a result of you signing this authorization could be re-disclosed by the recipient. If this occurs, your re-disclosed health information may no longer be protected by State or Federal laws.  * You may refuse to sign this Authorization. Your refusal will not affect your ability to obtain treatment.  * This Authorization becomes effective upon signing and will  on (date) __________.      If no date is indicated, this Authorization will  one (1) year from the signature date.    Name: aRyo Salas    Signature:   Date:     2018          PLEASE FAX REQUESTED RECORDS BACK TO: [phone]

## 2018-05-09 NOTE — ASSESSMENT & PLAN NOTE
Chronic problem stable managed with diet modifications and occasional PPI use if he knows diet will be poor. Unsure when last EGD was, but he reports they have been doing it every 2-3 years.

## 2018-05-09 NOTE — PROGRESS NOTES
Subjective:     Chief Complaint   Patient presents with   • Annual Exam     med refill       HPI  Demarcus Salas is a 62 y.o. male here today for annual exam     Essential hypertension  Ongoing chronic problem. There was miscommunication between pharmacy and myself so he has been on lisinopril 5 mg daily, but previously was lisinopril-hctz 20-25 mg daily. Not monitoring bp at home, but at [facility] is 130s/80s on this dose.     Caffeine: minimal   Exercise: decreasing  Nutrition: healthy     Ponce's esophagus with dysplasia  Chronic problem stable managed with diet modifications and occasional PPI use if he knows diet will be poor. Unsure when last EGD was, but he reports they have been doing it every 2-3 years.     Other male erectile dysfunction  This is a new developing problem. Not always occurring. It is situational dependent. bp is well controlled. No other significant contributing factors or medications that would be responsible.        Diagnoses of Well adult exam, Essential hypertension, Ponce's esophagus with dysplasia, Other male erectile dysfunction, and Screening for prostate cancer were pertinent to this visit.    Allergies: Penicillins  Current medicines (including changes today)  Current Outpatient Prescriptions   Medication Sig Dispense Refill   • lisinopril-hydrochlorothiazide (PRINZIDE, ZESTORETIC) 20-12.5 MG per tablet Take 1 Tab by mouth every day. 90 Tab 3   • thiamine (THIAMINE) 100 MG Tab Take 100 mg by mouth every day.     • Acetylcysteine (NAC) 600 MG Cap Take 1 Cap by mouth every day.     • Cholecalciferol (VITAMIN D) 2000 UNIT Tab Take  by mouth every day.     • Turmeric 500 MG Tab Take 1 Tab by mouth every day.     • Milk Thistle 250 MG Cap Take 1 Cap by mouth every day.     • vitamin e (VITAMIN E) 400 UNIT Cap Take 400 Units by mouth every day.       No current facility-administered medications for this visit.        He  has a past medical history of Ponce esophagus; Duodenal ulcer; and  "Hypertension.    Health Maintenance: UTD    ROS  Constitutional: No F/C, night sweats, fatigue, weight gain or loss, trouble sleeping  Head/Neck: No headache, dizziness, or ashly enlargement  Eyes: No change in vision, pain, redness, discharge  Ears: No pain, tinnitus, discharge, hearing loss  Nose: No discharge, sinus pain, nosebleeds, allergies  Mouth/Throat: No sores or lesions, sore throat, globus sensation, hoarseness, dysphagia  Lungs: No cough, sob, dyspnea, wheezing  Cardiac: No chest pain, palpitations, syncope or near syncope, JAY, PND, or LE edema  Skin: No color changes, itching, dryness, rashes  Heme: No increased bruising or prolonged bleeding  Endo: No heat or cold intolerance, excessively dry skin, sweating, polydipsia, polyuria, or polyphagia.    GI: No pain, n/v, heartburn, blood in stool, constipation or diarrhea  MSK: No joint pain, stiffness, swelling, heat, redness       Objective:     Blood pressure 130/86, pulse 71, temperature 36.3 °C (97.4 °F), height 1.702 m (5' 7\"), weight 81.2 kg (179 lb), SpO2 96 %. Body mass index is 28.04 kg/m².  Physical Exam:  General: Alert, oriented, in no acute distress.  Eye contact is good, speech goal directed, affect calm  CNs grossly intact.  HEENT: conjunctiva non-injected, sclera non-icteric, EOMs intact. PERRL. No lid edema or eye drainage.   Pinna normal without skin lesions. TM pearly agarwal. Gross hearing intact.  Nasal turbinates without edema or drainage.   Oral mucous membranes pink and moist with no lesions. Oropharynx without erythema, or exudate.   Neck: Supple. No adenopathy or masses in the cervical or supraclavicular regions. No thyromegaly  Lungs: unlabored. clear to auscultation bilaterally with good excursion.  CV: regular rate and rhythm. No murmurs. No carotid bruits.   Abdomen: Soft, ND, non-tender. No hepatosplenomegaly  Ext: no edema, normal color and temperature.   Skin: No rashes or lesions in visible areas  Gait steady.     Assessment " and Plan:   Assessment/Plan:  1. Well adult exam  Well adult male with no abnormal findings  - CBC WITH DIFFERENTIAL; Future  - COMP METABOLIC PANEL; Future    2. Essential hypertension  Stable, but borderline. Return back to baseline medication with lower hctz dose.   - lisinopril-hydrochlorothiazide (PRINZIDE, ZESTORETIC) 20-12.5 MG per tablet; Take 1 Tab by mouth every day.  Dispense: 90 Tab; Refill: 3  - LIPID PROFILE; Future  - TSH WITH REFLEX TO FT4; Future    3. Ponce's esophagus with dysplasia  Stable. Obtain records from GI to determine when next endoscopy due     4. Other male erectile dysfunction  New problem. Discussed option of medication for PRN use. He will discuss this with his wife. Check labs next year.   - PROSTATE SPECIFIC AG SCREENING; Future  - TSH WITH REFLEX TO FT4; Future    5. Screening for prostate cancer  After informed consent of risks, benefits, limitations, uncertainties, and potential for substantial harm, we have performed a shared-decision making process and patient has chosen to screen for prostate cancer with PSA testing.   - PROSTATE SPECIFIC AG SCREENING; Future       Follow up:  Return in about 1 year (around 5/9/2019).    Educated in proper administration of medication(s) ordered today including safety, possible SE, risks, benefits, rationale and alternatives to therapy.   Supportive care, differential diagnoses, and indications for immediate follow-up discussed with patient.    Pathogenesis of diagnosis discussed including typical length and natural progression.    Instructed to return to clinic or nearest emergency department for any change in condition, further concerns, or worsening of symptoms.  Patient states understanding of the plan of care and discharge instructions.      Please note that this dictation was created using voice recognition software. I have made every reasonable attempt to correct obvious errors, but I expect that there are errors of grammar and  possibly content that I did not discover before finalizing the note.    Followup: Return in about 1 year (around 5/9/2019). sooner should new symptoms or problems arise.

## 2018-05-09 NOTE — ASSESSMENT & PLAN NOTE
Ongoing chronic problem. There was miscommunication between pharmacy and myself so he has been on lisinopril 5 mg daily, but previously was lisinopril-hctz 20-25 mg daily. Not monitoring bp at home, but at [facility] is 130s/80s on this dose.     Caffeine: minimal   Exercise: decreasing  Nutrition: healthy

## 2018-06-19 ENCOUNTER — HOSPITAL ENCOUNTER (OUTPATIENT)
Facility: MEDICAL CENTER | Age: 63
End: 2018-06-19
Attending: PHYSICIAN ASSISTANT
Payer: COMMERCIAL

## 2018-06-19 ENCOUNTER — OFFICE VISIT (OUTPATIENT)
Dept: URGENT CARE | Facility: PHYSICIAN GROUP | Age: 63
End: 2018-06-19
Payer: COMMERCIAL

## 2018-06-19 VITALS
DIASTOLIC BLOOD PRESSURE: 86 MMHG | HEIGHT: 68 IN | HEART RATE: 93 BPM | SYSTOLIC BLOOD PRESSURE: 150 MMHG | OXYGEN SATURATION: 96 % | BODY MASS INDEX: 25.76 KG/M2 | WEIGHT: 170 LBS | RESPIRATION RATE: 16 BRPM | TEMPERATURE: 98.5 F

## 2018-06-19 DIAGNOSIS — M79.671 RIGHT FOOT PAIN: ICD-10-CM

## 2018-06-19 LAB — URATE SERPL-MCNC: 5.7 MG/DL (ref 2.5–8.3)

## 2018-06-19 PROCEDURE — 99214 OFFICE O/P EST MOD 30 MIN: CPT | Performed by: PHYSICIAN ASSISTANT

## 2018-06-19 PROCEDURE — 84550 ASSAY OF BLOOD/URIC ACID: CPT

## 2018-06-19 ASSESSMENT — ENCOUNTER SYMPTOMS
TINGLING: 0
CHILLS: 0
WEAKNESS: 0
NUMBNESS: 0
SHORTNESS OF BREATH: 0
VOMITING: 0
ABDOMINAL PAIN: 0
DIZZINESS: 0
SENSORY CHANGE: 0
FEVER: 0
DIARRHEA: 0

## 2018-06-19 ASSESSMENT — PAIN SCALES - GENERAL: PAINLEVEL: 4=SLIGHT-MODERATE PAIN

## 2018-06-20 NOTE — PROGRESS NOTES
"Subjective:      Demarcus Salas is a 62 y.o. male who presents with Foot Pain (R foot x1day )            Foot Problem   This is a new problem. The current episode started yesterday. The problem occurs constantly. The problem has been unchanged. Pertinent negatives include no abdominal pain, chest pain, chills, congestion, fever, numbness, vomiting or weakness. The symptoms are aggravated by walking. He has tried NSAIDs for the symptoms. The treatment provided moderate relief.     Patient presents to urgent care reporting a 2 day history of right foot pain. No recent falls or trauma to the foot. He took OTC advil earlier today with good relief. No history of gout. He does reports a recent overindulgence in alcohol over the weekend while celebrating father's day with his son. He eats meat but not often. No known history of kidney disease.     Review of Systems   Constitutional: Negative for chills and fever.   HENT: Negative for congestion.    Respiratory: Negative for shortness of breath.    Cardiovascular: Negative for chest pain.   Gastrointestinal: Negative for abdominal pain, diarrhea and vomiting.   Genitourinary: Negative.    Musculoskeletal:        + right foot pain   Neurological: Negative for dizziness, tingling, sensory change, weakness and numbness.        Objective:     /86   Pulse 93   Temp 36.9 °C (98.5 °F)   Resp 16   Ht 1.727 m (5' 8\")   Wt 77.1 kg (170 lb)   SpO2 96%   BMI 25.85 kg/m²      Physical Exam   Constitutional: He is oriented to person, place, and time. He appears well-developed and well-nourished. No distress.   HENT:   Head: Normocephalic and atraumatic.   Eyes: Pupils are equal, round, and reactive to light.   Neck: Normal range of motion.   Cardiovascular: Normal rate.    Pulmonary/Chest: Effort normal.   Musculoskeletal:        Right foot: There is decreased range of motion, tenderness and bony tenderness. There is no swelling.        Feet:    Slight erythema and warmth to " touch present over dorsal aspect of right midfoot, +TTP. Distally n/v intact.    Neurological: He is alert and oriented to person, place, and time.   Skin: Skin is warm and dry. He is not diaphoretic.   Psychiatric: He has a normal mood and affect. His behavior is normal.   Nursing note and vitals reviewed.         PMH:  has a past medical history of Ponce esophagus; Duodenal ulcer; and Hypertension.  MEDS:   Current Outpatient Prescriptions:   •  lisinopril-hydrochlorothiazide (PRINZIDE, ZESTORETIC) 20-12.5 MG per tablet, Take 1 Tab by mouth every day., Disp: 90 Tab, Rfl: 3  •  thiamine (THIAMINE) 100 MG Tab, Take 100 mg by mouth every day., Disp: , Rfl:   •  Acetylcysteine (NAC) 600 MG Cap, Take 1 Cap by mouth every day., Disp: , Rfl:   •  Cholecalciferol (VITAMIN D) 2000 UNIT Tab, Take  by mouth every day., Disp: , Rfl:   •  Turmeric 500 MG Tab, Take 1 Tab by mouth every day., Disp: , Rfl:   •  Milk Thistle 250 MG Cap, Take 1 Cap by mouth every day., Disp: , Rfl:   •  vitamin e (VITAMIN E) 400 UNIT Cap, Take 400 Units by mouth every day., Disp: , Rfl:   ALLERGIES:   Allergies   Allergen Reactions   • Penicillins      Childhood RXN     SURGHX:   Past Surgical History:   Procedure Laterality Date   • MENISCUS REPAIR      unkown side   • ROTATOR CUFF REPAIR Bilateral      SOCHX:  reports that he has never smoked. He has never used smokeless tobacco. He reports that he drinks about 12.6 oz of alcohol per week . He reports that he does not use drugs.  FH: family history includes Diabetes in his mother; Heart Failure in his mother; Lung Cancer in his father; Lung Disease in his father; No Known Problems in his brother, brother, and son; Sleep Apnea in his mother.       Assessment/Plan:     1. Right foot pain  - URIC ACID, SERUM    Called and spoke to patient regarding uric acid result, WNL. Patient reports the foot has already much improved over the afternoon. Encouraged icing and OTC nsaids as needed for pain. RTC  or follow up with his PCP for any recurrent symptoms. The patient demonstrated a good understanding and agreed with the treatment plan.

## 2019-05-16 DIAGNOSIS — I10 ESSENTIAL HYPERTENSION: ICD-10-CM

## 2019-05-16 RX ORDER — LISINOPRIL AND HYDROCHLOROTHIAZIDE 20; 12.5 MG/1; MG/1
1 TABLET ORAL DAILY
Qty: 90 TAB | Refills: 0 | Status: SHIPPED | OUTPATIENT
Start: 2019-05-16 | End: 2019-07-01 | Stop reason: SDUPTHER

## 2019-05-16 NOTE — TELEPHONE ENCOUNTER
Was the patient seen in the last year in this department? No LOV 18 LABS ORDERED NOT COMPLETE AND     Does patient have an active prescription for medications requested? No     Received Request Via: Pharmacy

## 2019-07-01 DIAGNOSIS — I10 ESSENTIAL HYPERTENSION: ICD-10-CM

## 2019-07-01 RX ORDER — LISINOPRIL AND HYDROCHLOROTHIAZIDE 20; 12.5 MG/1; MG/1
1 TABLET ORAL DAILY
Qty: 30 TAB | Refills: 1 | Status: SHIPPED | OUTPATIENT
Start: 2019-07-01 | End: 2019-09-04 | Stop reason: SDUPTHER

## 2019-09-04 ENCOUNTER — OFFICE VISIT (OUTPATIENT)
Dept: MEDICAL GROUP | Facility: PHYSICIAN GROUP | Age: 64
End: 2019-09-04
Payer: COMMERCIAL

## 2019-09-04 VITALS
SYSTOLIC BLOOD PRESSURE: 132 MMHG | RESPIRATION RATE: 12 BRPM | HEART RATE: 90 BPM | HEIGHT: 68 IN | TEMPERATURE: 98 F | OXYGEN SATURATION: 96 % | BODY MASS INDEX: 25.39 KG/M2 | WEIGHT: 167.5 LBS | DIASTOLIC BLOOD PRESSURE: 86 MMHG

## 2019-09-04 DIAGNOSIS — I10 ESSENTIAL HYPERTENSION: ICD-10-CM

## 2019-09-04 DIAGNOSIS — R06.83 SNORING: ICD-10-CM

## 2019-09-04 DIAGNOSIS — Z11.59 ENCOUNTER FOR HEPATITIS C SCREENING TEST FOR LOW RISK PATIENT: ICD-10-CM

## 2019-09-04 DIAGNOSIS — Z78.9 ALCOHOL USE: ICD-10-CM

## 2019-09-04 DIAGNOSIS — E55.9 VITAMIN D DEFICIENCY: ICD-10-CM

## 2019-09-04 DIAGNOSIS — E53.8 VITAMIN B12 DEFICIENCY: ICD-10-CM

## 2019-09-04 DIAGNOSIS — D22.9 NUMEROUS MOLES: ICD-10-CM

## 2019-09-04 DIAGNOSIS — Z12.11 SCREEN FOR COLON CANCER: ICD-10-CM

## 2019-09-04 DIAGNOSIS — E83.42 HYPOMAGNESEMIA: ICD-10-CM

## 2019-09-04 DIAGNOSIS — R35.89 POLYURIA: ICD-10-CM

## 2019-09-04 DIAGNOSIS — K22.719 BARRETT'S ESOPHAGUS WITH DYSPLASIA: ICD-10-CM

## 2019-09-04 DIAGNOSIS — G47.30 SLEEP APNEA, UNSPECIFIED TYPE: ICD-10-CM

## 2019-09-04 DIAGNOSIS — Z51.81 MEDICATION MONITORING ENCOUNTER: ICD-10-CM

## 2019-09-04 DIAGNOSIS — Z12.5 SCREENING PSA (PROSTATE SPECIFIC ANTIGEN): ICD-10-CM

## 2019-09-04 DIAGNOSIS — E78.5 DYSLIPIDEMIA: ICD-10-CM

## 2019-09-04 DIAGNOSIS — R53.83 FATIGUE, UNSPECIFIED TYPE: ICD-10-CM

## 2019-09-04 DIAGNOSIS — D22.9 MULTIPLE ATYPICAL SKIN MOLES: ICD-10-CM

## 2019-09-04 PROCEDURE — 99215 OFFICE O/P EST HI 40 MIN: CPT | Performed by: FAMILY MEDICINE

## 2019-09-04 RX ORDER — LISINOPRIL AND HYDROCHLOROTHIAZIDE 20; 12.5 MG/1; MG/1
1 TABLET ORAL DAILY
Qty: 90 TAB | Refills: 0 | Status: SHIPPED | OUTPATIENT
Start: 2019-09-04 | End: 2020-01-09 | Stop reason: SDUPTHER

## 2019-09-04 NOTE — PROGRESS NOTES
cc: Establish care, Ponce's esophagus, screen for colon cancer, hypertension    Subjective:     Demarcus Salas is a 63 y.o. male presenting Patient is  with one adult son. 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  Enjoying his [facility].  He is very independent.  No social or domestic concerns.  About 9 years ago he had a endoscopy and was diagnosed with Ponce's.  He had more heartburn in the past but now he gets heartburn about once a week.  When he does get heartburn he takes some baking soda and then it is gone.  He is a non-smoker.  He has snuff tobacco in the past but quit over 13 years ago.  He does currently drink about 2 glasses of wine daily and sometimes may be a couple of other drinks depending.  He has had high blood pressure for about 30 years.  His blood pressure medication has been the same for years which is lisinopril/hydrochlorothiazide 20/12.5 mg combination medication which he takes daily in the morning.  He does not take too many over-the-counter pain medications.  He has had history of duodenal ulcer in the past.  No recent ER visits or hospitalizations this year.  He did have concern in the past for possible sleep apnea as his wife noticed he did stop breathing at night.  He still has some snoring and apneic episodes at night that his wife notices.  He has not seen a dermatologist recently.  But he does have numerous moles.  He was last seen by his primary May 2018 by Kevin and he is due for lab work.  He has had some hip issues in the past and joint issues in the past and rotator cuff repairs in both shoulders and he will let me know if there are any issues in the future but he is doing fairly well right now.  Denies any falls or balance issues.    Review of systems:     Constitutional: Negative for fever, chills and positive fatigue.   HENT: Negative for sinus pressure, negative for ear pain or hearing loss  Eyes: Negative for blurriness, negative for double vision  Respiratory:  "Negative for cough and shortness of breath, negative for exertional shortness of breath  Cardiovascular: Negative for leg swelling, negative for palpitations, negative for chest pain  Gastrointestinal: Negative for nausea, vomiting, abdominal pain, constipation and diarrhea.  Positive heartburn once a week.  Genitourinary: Negative for dysuria and hematuria.   Skin: Negative for rash.  Numerous moles head to toe.  Neurological: Negative for dizziness, focal weakness and headaches.   Endo/Heme/Allergies: Denies bleeding, bruising, and recurrent allergies.  Psychiatric/Behavioral: Negative for depression and anxiety.        Current Outpatient Medications:   •  lisinopril-hydrochlorothiazide (PRINZIDE, ZESTORETIC) 20-12.5 MG per tablet, Take 1 Tab by mouth every day., Disp: 90 Tab, Rfl: 0  •  thiamine (THIAMINE) 100 MG Tab, Take 100 mg by mouth every day., Disp: , Rfl:   •  Acetylcysteine (NAC) 600 MG Cap, Take 1 Cap by mouth every day., Disp: , Rfl:   •  Cholecalciferol (VITAMIN D) 2000 UNIT Tab, Take  by mouth every day., Disp: , Rfl:   •  Turmeric 500 MG Tab, Take 1 Tab by mouth every day., Disp: , Rfl:   •  Milk Thistle 250 MG Cap, Take 1 Cap by mouth every day., Disp: , Rfl:   •  vitamin e (VITAMIN E) 400 UNIT Cap, Take 400 Units by mouth every day., Disp: , Rfl:     Allergies, past medical history, past surgical history, family history, social history reviewed and updated    Objective:     Vitals: /86 (BP Location: Left arm, Patient Position: Sitting, BP Cuff Size: Adult)   Pulse 90   Temp 36.7 °C (98 °F) (Temporal)   Resp 12   Ht 1.727 m (5' 8\")   Wt 76 kg (167 lb 8 oz)   SpO2 96%   BMI 25.47 kg/m²    General: Alert, pleasant, NAD  HEENT: Normocephalic.  Nontraumatic. EOMI, no icterus or pallor.  Conjunctivae and lids normal. External ears normal. Oropharynx non-erythematous, mucous membranes moist.  Neck supple.  No thyromegaly or masses palpated. No cervical or supraclavicular " lymphadenopathy.  Heart: Regular rate and rhythm.  S1 and S2 normal.  No murmurs appreciated.  Respiratory: Normal respiratory effort.  Clear to auscultation bilaterally.  Skin: Warm, dry, no rashes.  Musculoskeletal: Gait is normal.  Moves all extremities well.  Extremities: No leg edema.    Psych:  Affect/mood is normal, judgement is good, memory is intact, grooming is appropriate.    Assessment/Plan:     Demarcus was seen today for establish care.    Diagnoses and all orders for this visit:    Essential hypertension  -     Comp Metabolic Panel; Future  -     lisinopril-hydrochlorothiazide (PRINZIDE, ZESTORETIC) 20-12.5 MG per tablet; Take 1 Tab by mouth every day.    Ponce's esophagus with dysplasia  -     REFERRAL TO GASTROENTEROLOGY    Screen for colon cancer  -     REFERRAL TO GI FOR COLONOSCOPY    Medication monitoring encounter  -     CBC WITH DIFFERENTIAL; Future  -     Comp Metabolic Panel; Future  -     PROSTATE SPECIFIC AG SCREENING; Future    Numerous moles  -     REFERRAL TO DERMATOLOGY    Multiple atypical skin moles  -     REFERRAL TO DERMATOLOGY    Sleep apnea, unspecified type  -     REFERRAL TO SLEEP STUDIES    Snoring  -     REFERRAL TO SLEEP STUDIES    Polyuria  -     PROSTATE SPECIFIC AG SCREENING; Future    Screening PSA (prostate specific antigen)  -     PROSTATE SPECIFIC AG SCREENING; Future    Vitamin D deficiency  -     VITAMIN D,25 HYDROXY; Future    Vitamin B12 deficiency  -     VITAMIN B12; Future    Hypomagnesemia  -     MAGNESIUM; Future    Dyslipidemia  -     Lipid Profile; Future    Encounter for hepatitis C screening test for low risk patient  -     HEPATITIS PANEL ACUTE(4 COMPONENTS); Future    Fatigue, unspecified type  -     CBC WITH DIFFERENTIAL; Future  -     TSH WITH REFLEX TO FT4; Future    Alcohol use    -Will refer to dermatology for annual skin cancer screening, will refer to GI for colonoscopy and possible endoscopy for his history of Ponce's esophagus.  He only gets  heartburn once a week and baking soda seems to help for this so he does not want any medication as he has been on omeprazole in the past but he would like to avoid medications.  We will send him for sleep study for his history of apneic episodes at night and snoring.  At least 1 week before you see me in about 2 weeks please get fasting lab work 8 hours of no eating but drink plenty of water.  Also please get blood pressure cuff and make sure its proper size and if they do not have your size ask the pharmacy to order it for you either at [facility] or which ever pharmacy or online and please record your blood pressure the top number systolic and the bottom number diastolic and your heart rate in both arms about 2-3 times a week sometimes in the morning, afternoon and evening and record this value and bring this to your next appointment.  Ideal blood pressure is 120s over 70s and slightly lower but if your blood pressures going too low or too high we may need to adjust your medications and based on your lab work.  Otherwise I would recommend doing daily yoga breathing and stretching to help with your blood pressure and also your previous joint issues which are not bothering him right now and he will continue to do turmeric for anti-inflammatory and is not needing too many pain medications.  Try to incorporate more exercising such as yoga and swimming to your lifestyle if possible.  He does not smoke but he has chewed tobacco in the past.  Make sure he go to the dentist routinely and the eye doctor.  We also talked about decreasing alcohol use to no more than 2 wine glasses a day which she normally 6 to but sometimes goes a little over.  Due to his blood pressure issues and his Ponce's esophagus is another reason to decrease his alcohol use prevent any progression of the Ponce's and/or blood pressure.  We will check out his liver function testing.  Otherwise he eats quite healthy and has maintained a good weight and is  quite active and independent.  ER precautions given if any chest pain, shortness of breath, passing out then please go to the ER call [phone] otherwise we will see him back in about 6 weeks to go over his blood pressure, and labs and see if we need to make any medication adjustments.    Return in about 6 weeks (around 10/16/2019), or lab FU/BP/Sleep/Derm.    Patient was seen for 60 minutes face-to-face of which greater than 50% of the visit was spent in counseling and coordination of care as documented above in their assessment and plan.

## 2019-09-04 NOTE — PATIENT INSTRUCTIONS
Demarcus was seen today for establish care.    Diagnoses and all orders for this visit:    Essential hypertension  -     Comp Metabolic Panel; Future  -     lisinopril-hydrochlorothiazide (PRINZIDE, ZESTORETIC) 20-12.5 MG per tablet; Take 1 Tab by mouth every day.    Ponce's esophagus with dysplasia  -     REFERRAL TO GASTROENTEROLOGY    Screen for colon cancer  -     REFERRAL TO GI FOR COLONOSCOPY    Medication monitoring encounter  -     CBC WITH DIFFERENTIAL; Future  -     Comp Metabolic Panel; Future  -     PROSTATE SPECIFIC AG SCREENING; Future    Numerous moles  -     REFERRAL TO DERMATOLOGY    Multiple atypical skin moles  -     REFERRAL TO DERMATOLOGY    Sleep apnea, unspecified type  -     REFERRAL TO SLEEP STUDIES    Snoring  -     REFERRAL TO SLEEP STUDIES    Polyuria  -     PROSTATE SPECIFIC AG SCREENING; Future    Screening PSA (prostate specific antigen)  -     PROSTATE SPECIFIC AG SCREENING; Future    Vitamin D deficiency  -     VITAMIN D,25 HYDROXY; Future    Vitamin B12 deficiency  -     VITAMIN B12; Future    Hypomagnesemia  -     MAGNESIUM; Future    Dyslipidemia  -     Lipid Profile; Future    Encounter for hepatitis C screening test for low risk patient  -     HEPATITIS PANEL ACUTE(4 COMPONENTS); Future    Fatigue, unspecified type  -     CBC WITH DIFFERENTIAL; Future  -     TSH WITH REFLEX TO FT4; Future    Alcohol use    -Will refer to dermatology for annual skin cancer screening, will refer to GI for colonoscopy and possible endoscopy for his history of Ponce's esophagus.  He only gets heartburn once a week and baking soda seems to help for this so he does not want any medication as he has been on omeprazole in the past but he would like to avoid medications.  We will send him for sleep study for his history of apneic episodes at night and snoring.  At least 1 week before you see me in about 2 weeks please get fasting lab work 8 hours of no eating but drink plenty of water.  Also please get  blood pressure cuff and make sure its proper size and if they do not have your size ask the pharmacy to order it for you either at [facility] or which ever pharmacy or online and please record your blood pressure the top number systolic and the bottom number diastolic and your heart rate in both arms about 2-3 times a week sometimes in the morning, afternoon and evening and record this value and bring this to your next appointment.  Ideal blood pressure is 120s over 70s and slightly lower but if your blood pressures going too low or too high we may need to adjust your medications and based on your lab work.  Otherwise I would recommend doing daily yoga breathing and stretching to help with your blood pressure and also your previous joint issues which are not bothering him right now and he will continue to do turmeric for anti-inflammatory and is not needing too many pain medications.  Try to incorporate more exercising such as yoga and swimming to your lifestyle if possible.  He does not smoke but he has chewed tobacco in the past.  Make sure he go to the dentist routinely and the eye doctor.  We also talked about decreasing alcohol use to no more than 2 wine glasses a day which she normally 6 to but sometimes goes a little over.  Due to his blood pressure issues and his Ponce's esophagus is another reason to decrease his alcohol use prevent any progression of the Ponce's and/or blood pressure.  We will check out his liver function testing.  Otherwise he eats quite healthy and has maintained a good weight and is quite active and independent.  ER precautions given if any chest pain, shortness of breath, passing out then please go to the ER call [phone] otherwise we will see him back in about 6 weeks to go over his blood pressure, and labs and see if we need to make any medication adjustments.    Return in about 6 weeks (around 10/16/2019), or lab FU/BP/Sleep/Derm.    Hip Exercises  Ask your health care provider which  exercises are safe for you. Do exercises exactly as told by your health care provider and adjust them as directed. It is normal to feel mild stretching, pulling, tightness, or discomfort as you do these exercises, but you should stop right away if you feel sudden pain or your pain gets worse. Do not begin these exercises until told by your health care provider.  STRETCHING AND RANGE OF MOTION EXERCISES   These exercises warm up your muscles and joints and improve the movement and flexibility of your hip. These exercises also help to relieve pain, numbness, and tingling.  Exercise A: Hamstrings, Supine   1. Lie on your back.  2. Loop a belt or towel over the ball of your left / right foot. The ball of your foot is on the walking surface, right under your toes.  3. Straighten your left / right knee and slowly pull on the belt to raise your leg.  ¨ Do not let your left / right knee bend while you do this.  ¨ Keep your other leg flat on the floor.  ¨ Raise the left / right leg until you feel a gentle stretch behind your left / right knee or thigh.  4. Hold this position for __________ seconds.  5. Slowly return your leg to the starting position.  Repeat __________ times. Complete this stretch __________ times a day.  Exercise B: Hip Rotators   1. Lie on your back on a firm surface.  2. Hold your left / right knee with your left / right hand. Hold your ankle with your other hand.  3. Gently pull your left / right knee and rotate your lower leg toward your other shoulder.  ¨ Pull until you feel a stretch in your buttocks.  ¨ Keep your hips and shoulders firmly planted while you do this stretch.  4. Hold this position for __________ seconds.  Repeat __________ times. Complete this stretch __________ times a day.  Exercise C: V-Sit (Hamstrings and Adductors)   1. Sit on the floor with your legs extended in a large “V” shape. Keep your knees straight during this exercise.  2. Start with your head and chest upright, then bend  at your waist to reach for your left foot (position A). You should feel a stretch in your right inner thigh.  3. Hold this position for __________ seconds. Then slowly return to the upright position.  4. Bend at your waist to reach forward (position B). You should feel a stretch behind both of your thighs and knees.  5. Hold this position for __________ seconds. Then slowly return to the upright position.  6. Bend at your waist to reach for your right foot (position C). You should feel a stretch in your left inner thigh.  7. Hold this position for __________ seconds. Then slowly return to the upright position.  Repeat __________ times. Complete this stretch __________ times a day.  Exercise D: Lunge (Hip Flexors)   1. Place your left / right knee on the floor and bend your other knee so that is directly over your ankle. You should be half-kneeling.  2. Keep good posture with your head over your shoulders.  3. Tighten your buttocks to point your tailbone downward. This helps your back to keep from arching too much.  4. You should feel a gentle stretch in the front of your left / right thigh and hip. If you do not feel any resistance, slightly slide your other foot forward and then slowly lunge forward so your knee once again lines up over your ankle.  5. Make sure your tailbone continues to point downward.  6. Hold this position for __________ seconds.  Repeat __________ times. Complete this stretch __________ times a day.  STRENGTHENING EXERCISES   These exercises build strength and endurance in your hip. Endurance is the ability to use your muscles for a long time, even after they get tired.  Exercise E: Bridge (Hip Extensors)   1. Lie on your back on a firm surface with your knees bent and your feet flat on the floor.  2. Tighten your buttocks muscles and lift your bottom off the floor until the trunk of your body is level with your thighs.  ¨ Do not arch your back.  ¨ You should feel the muscles working in your  buttocks and the back of your thighs. If you do not feel these muscles, slide your feet 1-2 inches (2.5-5 cm) farther away from your buttocks.  3. Hold this position for __________ seconds.  4. Slowly lower your hips to the starting position.  5. Let your muscles relax completely between repetitions.  6. If this exercise is too easy, try doing it with your arms crossed over your chest.  Repeat __________ times. Complete this exercise __________ times a day.  Exercise F: Straight Leg Raises - Hip Abductors   1. Lie on your side with your left / right leg in the top position. Lie so your head, shoulder, knee, and hip line up with each other. You may bend your bottom knee to help you balance.  2. Roll your hips slightly forward, so your hips are stacked directly over each other and your left / right knee is facing forward.  3. Leading with your heel, lift your top leg 4-6 inches (10-15 cm). You should feel the muscles in your outer hip lifting.  ¨ Do not let your foot drift forward.  ¨ Do not let your knee roll toward the ceiling.  4. Hold this position for __________ seconds.  5. Slowly return to the starting position.  6. Let your muscles relax completely between repetitions.  Repeat __________ times. Complete this exercise __________ times a day.  Exercise G: Straight Leg Raises - Hip Adductors   1. Lie on your side with your left / right leg in the bottom position. Lie so your head, shoulder, knee, and hip line up. You may place your upper foot in front to help you balance.  2. Roll your hips slightly forward, so your hips are stacked directly over each other and your left / right knee is facing forward.  3. Tense the muscles in your inner thigh and lift your bottom leg 4-6 inches (10-15 cm).  4. Hold this position for __________ seconds.  5. Slowly return to the starting position.  6. Let your muscles relax completely between repetitions.  Repeat __________ times. Complete this exercise __________ times a  "day.  Exercise H: Straight Leg Raises - Quadriceps   1. Lie on your back with your left / right leg extended and your other knee bent.  2. Tense the muscles in the front of your left / right thigh. When you do this, you should see your kneecap slide up or see increased dimpling just above your knee.  3. Tighten these muscles even more and raise your leg 4-6 inches (10-15 cm) off the floor.  4. Hold this position for __________ seconds.  5. Keep these muscles tense as you lower your leg.  6. Relax the muscles slowly and completely between repetitions.  Repeat __________ times. Complete this exercise __________ times a day.  Exercise I: Hip Abductors, Standing  1. Tie one end of a rubber exercise band or tubing to a secure surface, such as a table or pole.  2. Loop the other end of the band or tubing around your left / right ankle.  3. Keeping your ankle with the band or tubing directly opposite of the secured end, step away until there is tension in the tubing or band. Hold onto a chair as needed for balance.  4. Lift your left / right leg out to your side. While you do this:  ¨ Keep your back upright.  ¨ Keep your shoulders over your hips.  ¨ Keep your toes pointing forward.  ¨ Make sure to use your hip muscles to lift your leg. Do not \"throw\" your leg or tip your body to lift your leg.  5. Hold this position for __________ seconds.  6. Slowly return to the starting position.  Repeat __________ times. Complete this exercise __________ times a day.  Exercise J: Squats (Quadriceps)  1.  a door frame so your feet and knees are in line with the frame. You may place your hands on the frame for balance.  2. Slowly bend your knees and lower your hips like you are going to sit in a chair.  ¨ Keep your lower legs in a straight-up-and-down position.  ¨ Do not let your hips go lower than your knees.  ¨ Do not bend your knees lower than told by your health care provider.  ¨ If your hip pain increases, do not bend as " low.  3. Hold this position for ___________ seconds.  4. Slowly push with your legs to return to standing. Do not use your hands to pull yourself to standing.  Repeat __________ times. Complete this exercise __________ times a day.  This information is not intended to replace advice given to you by your health care provider. Make sure you discuss any questions you have with your health care provider.  Document Released: 01/05/2007 Document Revised: 09/11/2017 Document Reviewed: 12/12/2016  [employer] Interactive Patient Education © 2017 [employer]

## 2019-11-14 ENCOUNTER — HOSPITAL ENCOUNTER (OUTPATIENT)
Dept: LAB | Facility: MEDICAL CENTER | Age: 64
End: 2019-11-14
Attending: FAMILY MEDICINE
Payer: COMMERCIAL

## 2019-11-14 DIAGNOSIS — I10 ESSENTIAL HYPERTENSION: ICD-10-CM

## 2019-11-14 DIAGNOSIS — R35.89 POLYURIA: ICD-10-CM

## 2019-11-14 DIAGNOSIS — E83.42 HYPOMAGNESEMIA: ICD-10-CM

## 2019-11-14 DIAGNOSIS — Z51.81 MEDICATION MONITORING ENCOUNTER: ICD-10-CM

## 2019-11-14 DIAGNOSIS — Z11.59 ENCOUNTER FOR HEPATITIS C SCREENING TEST FOR LOW RISK PATIENT: ICD-10-CM

## 2019-11-14 DIAGNOSIS — E55.9 VITAMIN D DEFICIENCY: ICD-10-CM

## 2019-11-14 DIAGNOSIS — E53.8 VITAMIN B12 DEFICIENCY: ICD-10-CM

## 2019-11-14 DIAGNOSIS — E78.5 DYSLIPIDEMIA: ICD-10-CM

## 2019-11-14 DIAGNOSIS — R53.83 FATIGUE, UNSPECIFIED TYPE: ICD-10-CM

## 2019-11-14 DIAGNOSIS — Z12.5 SCREENING PSA (PROSTATE SPECIFIC ANTIGEN): ICD-10-CM

## 2019-11-14 LAB
25(OH)D3 SERPL-MCNC: 24 NG/ML (ref 30–100)
ALBUMIN SERPL BCP-MCNC: 4.1 G/DL (ref 3.2–4.9)
ALBUMIN/GLOB SERPL: 1.5 G/DL
ALP SERPL-CCNC: 59 U/L (ref 30–99)
ALT SERPL-CCNC: 21 U/L (ref 2–50)
ANION GAP SERPL CALC-SCNC: 10 MMOL/L (ref 0–11.9)
AST SERPL-CCNC: 25 U/L (ref 12–45)
BASOPHILS # BLD AUTO: 0.9 % (ref 0–1.8)
BASOPHILS # BLD: 0.05 K/UL (ref 0–0.12)
BILIRUB SERPL-MCNC: 0.7 MG/DL (ref 0.1–1.5)
BUN SERPL-MCNC: 13 MG/DL (ref 8–22)
CALCIUM SERPL-MCNC: 9.2 MG/DL (ref 8.5–10.5)
CHLORIDE SERPL-SCNC: 106 MMOL/L (ref 96–112)
CHOLEST SERPL-MCNC: 218 MG/DL (ref 100–199)
CO2 SERPL-SCNC: 24 MMOL/L (ref 20–33)
CREAT SERPL-MCNC: 0.92 MG/DL (ref 0.5–1.4)
EOSINOPHIL # BLD AUTO: 0.33 K/UL (ref 0–0.51)
EOSINOPHIL NFR BLD: 5.9 % (ref 0–6.9)
ERYTHROCYTE [DISTWIDTH] IN BLOOD BY AUTOMATED COUNT: 41 FL (ref 35.9–50)
FASTING STATUS PATIENT QL REPORTED: NORMAL
GLOBULIN SER CALC-MCNC: 2.7 G/DL (ref 1.9–3.5)
GLUCOSE SERPL-MCNC: 92 MG/DL (ref 65–99)
HAV IGM SERPL QL IA: NEGATIVE
HBV CORE IGM SER QL: NEGATIVE
HBV SURFACE AG SER QL: NEGATIVE
HCT VFR BLD AUTO: 44.4 % (ref 42–52)
HCV AB SER QL: NEGATIVE
HDLC SERPL-MCNC: 58 MG/DL
HGB BLD-MCNC: 15.5 G/DL (ref 14–18)
IMM GRANULOCYTES # BLD AUTO: 0.01 K/UL (ref 0–0.11)
IMM GRANULOCYTES NFR BLD AUTO: 0.2 % (ref 0–0.9)
LDLC SERPL CALC-MCNC: 141 MG/DL
LYMPHOCYTES # BLD AUTO: 1.54 K/UL (ref 1–4.8)
LYMPHOCYTES NFR BLD: 27.6 % (ref 22–41)
MAGNESIUM SERPL-MCNC: 1.6 MG/DL (ref 1.5–2.5)
MCH RBC QN AUTO: 33 PG (ref 27–33)
MCHC RBC AUTO-ENTMCNC: 34.9 G/DL (ref 33.7–35.3)
MCV RBC AUTO: 94.7 FL (ref 81.4–97.8)
MONOCYTES # BLD AUTO: 0.84 K/UL (ref 0–0.85)
MONOCYTES NFR BLD AUTO: 15.1 % (ref 0–13.4)
NEUTROPHILS # BLD AUTO: 2.8 K/UL (ref 1.82–7.42)
NEUTROPHILS NFR BLD: 50.3 % (ref 44–72)
NRBC # BLD AUTO: 0 K/UL
NRBC BLD-RTO: 0 /100 WBC
PLATELET # BLD AUTO: 255 K/UL (ref 164–446)
PMV BLD AUTO: 10.8 FL (ref 9–12.9)
POTASSIUM SERPL-SCNC: 4.2 MMOL/L (ref 3.6–5.5)
PROT SERPL-MCNC: 6.8 G/DL (ref 6–8.2)
PSA SERPL-MCNC: 1.83 NG/ML (ref 0–4)
RBC # BLD AUTO: 4.69 M/UL (ref 4.7–6.1)
SODIUM SERPL-SCNC: 140 MMOL/L (ref 135–145)
TRIGL SERPL-MCNC: 94 MG/DL (ref 0–149)
TSH SERPL DL<=0.005 MIU/L-ACNC: 2.49 UIU/ML (ref 0.38–5.33)
VIT B12 SERPL-MCNC: 295 PG/ML (ref 211–911)
WBC # BLD AUTO: 5.6 K/UL (ref 4.8–10.8)

## 2019-11-14 PROCEDURE — 36415 COLL VENOUS BLD VENIPUNCTURE: CPT

## 2019-11-14 PROCEDURE — 84153 ASSAY OF PSA TOTAL: CPT

## 2019-11-14 PROCEDURE — 84443 ASSAY THYROID STIM HORMONE: CPT

## 2019-11-14 PROCEDURE — 80074 ACUTE HEPATITIS PANEL: CPT

## 2019-11-14 PROCEDURE — 83735 ASSAY OF MAGNESIUM: CPT

## 2019-11-14 PROCEDURE — 82306 VITAMIN D 25 HYDROXY: CPT

## 2019-11-14 PROCEDURE — 85025 COMPLETE CBC W/AUTO DIFF WBC: CPT

## 2019-11-14 PROCEDURE — 80061 LIPID PANEL: CPT

## 2019-11-14 PROCEDURE — 82607 VITAMIN B-12: CPT

## 2019-11-14 PROCEDURE — 80053 COMPREHEN METABOLIC PANEL: CPT

## 2019-12-16 ENCOUNTER — APPOINTMENT (RX ONLY)
Dept: URBAN - METROPOLITAN AREA CLINIC 22 | Facility: CLINIC | Age: 64
Setting detail: DERMATOLOGY
End: 2019-12-16

## 2019-12-16 DIAGNOSIS — L57.0 ACTINIC KERATOSIS: ICD-10-CM

## 2019-12-16 DIAGNOSIS — D22 MELANOCYTIC NEVI: ICD-10-CM

## 2019-12-16 DIAGNOSIS — Z71.89 OTHER SPECIFIED COUNSELING: ICD-10-CM

## 2019-12-16 DIAGNOSIS — L81.4 OTHER MELANIN HYPERPIGMENTATION: ICD-10-CM

## 2019-12-16 DIAGNOSIS — D18.0 HEMANGIOMA: ICD-10-CM

## 2019-12-16 PROBLEM — D18.01 HEMANGIOMA OF SKIN AND SUBCUTANEOUS TISSUE: Status: ACTIVE | Noted: 2019-12-16

## 2019-12-16 PROBLEM — D22.5 MELANOCYTIC NEVI OF TRUNK: Status: ACTIVE | Noted: 2019-12-16

## 2019-12-16 PROBLEM — D48.5 NEOPLASM OF UNCERTAIN BEHAVIOR OF SKIN: Status: ACTIVE | Noted: 2019-12-16

## 2019-12-16 PROCEDURE — 99203 OFFICE O/P NEW LOW 30 MIN: CPT | Mod: 25

## 2019-12-16 PROCEDURE — ? BIOPSY BY SHAVE METHOD

## 2019-12-16 PROCEDURE — ? LIQUID NITROGEN

## 2019-12-16 PROCEDURE — 17000 DESTRUCT PREMALG LESION: CPT | Mod: 59

## 2019-12-16 PROCEDURE — 11102 TANGNTL BX SKIN SINGLE LES: CPT

## 2019-12-16 PROCEDURE — ? COUNSELING

## 2019-12-16 ASSESSMENT — LOCATION SIMPLE DESCRIPTION DERM
LOCATION SIMPLE: INFERIOR FOREHEAD
LOCATION SIMPLE: NOSE
LOCATION SIMPLE: UPPER BACK
LOCATION SIMPLE: RIGHT HAND
LOCATION SIMPLE: LEFT FOREARM
LOCATION SIMPLE: RIGHT FOREARM
LOCATION SIMPLE: CHEST

## 2019-12-16 ASSESSMENT — LOCATION ZONE DERM
LOCATION ZONE: FACE
LOCATION ZONE: TRUNK
LOCATION ZONE: HAND
LOCATION ZONE: ARM
LOCATION ZONE: NOSE

## 2019-12-16 ASSESSMENT — LOCATION DETAILED DESCRIPTION DERM
LOCATION DETAILED: NASAL DORSUM
LOCATION DETAILED: INFERIOR MID FOREHEAD
LOCATION DETAILED: SUPERIOR THORACIC SPINE
LOCATION DETAILED: RIGHT VENTRAL PROXIMAL FOREARM
LOCATION DETAILED: RIGHT RADIAL DORSAL HAND
LOCATION DETAILED: LOWER STERNUM
LOCATION DETAILED: LEFT VENTRAL PROXIMAL FOREARM
LOCATION DETAILED: INFERIOR THORACIC SPINE

## 2019-12-16 NOTE — PROCEDURE: BIOPSY BY SHAVE METHOD
Electrodesiccation Text: The wound bed was treated with electrodesiccation after the biopsy was performed.
Depth Of Biopsy: dermis
Wound Care: Petrolatum
Bill For Surgical Tray: no
Size Of Lesion In Cm: 0
Notification Instructions: Patient will be notified of biopsy results. However, patient instructed to call the office if not contacted within 2 weeks.
Cryotherapy Text: The wound bed was treated with cryotherapy after the biopsy was performed.
Lab Facility: 
Render Post-Care Instructions In Note?: yes
Type Of Destruction Used: Curettage
Anesthesia Volume In Cc: 1
Electrodesiccation And Curettage Text: The wound bed was treated with electrodesiccation and curettage after the biopsy was performed.
Biopsy Type: H and E
Consent: Written consent was obtained and risks were reviewed including but not limited to scarring, infection, bleeding, scabbing, incomplete removal, nerve damage and allergy to anesthesia.
Billing Type: Third-Party Bill
Biopsy Method: Personna blade
Hemostasis: Drysol
Silver Nitrate Text: The wound bed was treated with silver nitrate after the biopsy was performed.
Dressing: pressure dressing with telfa
Anesthesia Type: 1% lidocaine with 1:100,000 epinephrine and a 1:10 solution of 8.4% sodium bicarbonate
Post-Care Instructions: I reviewed with the patient in detail post-care instructions. Patient is to keep the biopsy site dry overnight. Gentle cleansing daily.  Apply petroleum ointment daily until healed. Patient may apply hydrogen peroxide soaks to remove any crusting.
Lab: [id number]
Curettage Text: The wound bed was treated with curettage after the biopsy was performed.
Detail Level: Detailed

## 2019-12-16 NOTE — PROCEDURE: MIPS QUALITY
Quality 130: Documentation Of Current Medications In The Medical Record: Current Medications Documented
Detail Level: Detailed
Quality 226: Preventive Care And Screening: Tobacco Use: Screening And Cessation Intervention: Tobacco Screening not Performed for Medical Reasons

## 2020-01-09 ENCOUNTER — OFFICE VISIT (OUTPATIENT)
Dept: MEDICAL GROUP | Facility: PHYSICIAN GROUP | Age: 65
End: 2020-01-09
Payer: COMMERCIAL

## 2020-01-09 VITALS
BODY MASS INDEX: 25.76 KG/M2 | RESPIRATION RATE: 14 BRPM | OXYGEN SATURATION: 95 % | TEMPERATURE: 98.1 F | DIASTOLIC BLOOD PRESSURE: 62 MMHG | WEIGHT: 170 LBS | HEART RATE: 98 BPM | SYSTOLIC BLOOD PRESSURE: 118 MMHG | HEIGHT: 68 IN

## 2020-01-09 DIAGNOSIS — E53.8 VITAMIN B12 DEFICIENCY: ICD-10-CM

## 2020-01-09 DIAGNOSIS — I10 ESSENTIAL HYPERTENSION: ICD-10-CM

## 2020-01-09 DIAGNOSIS — G47.30 SLEEP APNEA, UNSPECIFIED TYPE: ICD-10-CM

## 2020-01-09 DIAGNOSIS — K21.9 GASTROESOPHAGEAL REFLUX DISEASE WITHOUT ESOPHAGITIS: ICD-10-CM

## 2020-01-09 DIAGNOSIS — E83.42 HYPOMAGNESEMIA: ICD-10-CM

## 2020-01-09 DIAGNOSIS — D22.9 MULTIPLE ATYPICAL SKIN MOLES: ICD-10-CM

## 2020-01-09 DIAGNOSIS — E55.9 VITAMIN D DEFICIENCY: ICD-10-CM

## 2020-01-09 DIAGNOSIS — E78.5 DYSLIPIDEMIA: ICD-10-CM

## 2020-01-09 DIAGNOSIS — Z51.81 MEDICATION MONITORING ENCOUNTER: ICD-10-CM

## 2020-01-09 DIAGNOSIS — K22.719 BARRETT'S ESOPHAGUS WITH DYSPLASIA: ICD-10-CM

## 2020-01-09 PROCEDURE — 99214 OFFICE O/P EST MOD 30 MIN: CPT | Performed by: FAMILY MEDICINE

## 2020-01-09 RX ORDER — CHOLECALCIFEROL (VITAMIN D3) 125 MCG
500 CAPSULE ORAL DAILY
COMMUNITY

## 2020-01-09 RX ORDER — LISINOPRIL AND HYDROCHLOROTHIAZIDE 20; 12.5 MG/1; MG/1
1 TABLET ORAL DAILY
Qty: 90 TAB | Refills: 0 | Status: SHIPPED | OUTPATIENT
Start: 2020-01-09 | End: 2020-03-24 | Stop reason: SDUPTHER

## 2020-01-09 RX ORDER — MAGNESIUM OXIDE 400 MG/1
400 TABLET ORAL DAILY
COMMUNITY
End: 2021-05-12

## 2020-01-09 RX ORDER — OMEPRAZOLE 40 MG/1
40 CAPSULE, DELAYED RELEASE ORAL DAILY
COMMUNITY
End: 2021-01-12

## 2020-01-09 RX ORDER — ATORVASTATIN CALCIUM 20 MG/1
20 TABLET, FILM COATED ORAL
Qty: 90 TAB | Refills: 1 | Status: SHIPPED | OUTPATIENT
Start: 2020-01-09 | End: 2020-04-28 | Stop reason: CLARIF

## 2020-01-09 ASSESSMENT — PATIENT HEALTH QUESTIONNAIRE - PHQ9: CLINICAL INTERPRETATION OF PHQ2 SCORE: 0

## 2020-01-09 NOTE — PATIENT INSTRUCTIONS
Diagnoses and all orders for this visit:    Ponce's esophagus with dysplasia    Essential hypertension  -     lisinopril-hydrochlorothiazide (PRINZIDE) 20-12.5 MG per tablet; Take 1 Tab by mouth every day.    Medication monitoring encounter    Multiple atypical skin moles    Sleep apnea, unspecified type    Gastroesophageal reflux disease without esophagitis    Dyslipidemia  -     atorvastatin (LIPITOR) 20 MG Tab; Take 1 Tab by mouth every bedtime.    Vitamin B12 deficiency    Vitamin D deficiency    Hypomagnesemia    -He did have his lab work done November 14 and his kidney GFR is greater than 60, CBC within normal limits, CMP within normal limits, TSH thyroid within normal limits, lipid panel is increased LDL is at 141 and HDL 58, triglycerides normal at 94 and total cholesterol high at 218,  Vitamin B-12 low at 295, vitamin D low at 24, magnesium low at 1.6, hepatitis panel negative, PSA 1.83 within normal limits.  -Please increase your vitamin D and take 4000 or 5000 units of vitamin D over-the-counter daily and vitamin B12 500 mcg daily and magnesium oxide 400 mg daily but magnesium could cause loose stools so take that slowly.  Also I would recommend for the Ponce's to do the omeprazole 40 mg and or 20 mg as advised by the GI specialist.  Also due to risk factors of the heart disease and your blood pressure we will start atorvastatin 20 mg daily and he will read patient instruction section as his cholesterol lipid panel was increased his LDL.  Also for now we will keep his blood pressure medication the same lisinopril/hydrochlorothiazide 20/12.5 mg tablet 1 daily and he will do his blood pressure logs 2-3 times a week sometimes morning sometimes night and write down please your systolic and diastolic blood pressure and heart rate for ideal blood pressure less than 130s over 90s and today he is doing fantastic but he is concerned that his blood pressure cuff is not calibrated properly so he will bring it to  his next appointment to see me 1 more time before I leave in about 4 weeks and he will bring this to this appointment so we could compare to the 1 at clinic to see if there is any difference otherwise he will continue to see the dermatologist and sleep doctor and gastroenterologist as needed and has all the referrals for that.  He does not need any more lab work before his next appointment and his next lab work we will do in about 6 months and I will give him that slip at his next appointment and we will see how his blood pressure is doing in case we need to increase or decrease it otherwise he is doing well with no other symptoms or issues and we will see him back to make sure his blood pressure is doing okay as his home readings are a bit more elevated than the readings here but he is currently asymptomatic.  He is aware if there is any chest pain, shortness of breath, passing out then please go to the ER call 911 otherwise we will see him back in about 4 weeks or before I leave to go over his blood pressure and he will start taking his new medication for his cholesterol and his Ponce's and his new set of vitamins and we will repeat his lab work in about 6 months.    Return in about 4 weeks (around 2/6/2020), or Blood pressure check with his machine.      Vitamin B12 Deficiency  Vitamin B12 deficiency occurs when the body does not have enough vitamin B12. Vitamin B12 is an important vitamin. The body needs vitamin B12:  · To make red blood cells.  · To make DNA. This is the genetic material inside cells.  · To help the nerves work properly so they can carry messages from the brain to the body.  Vitamin B12 deficiency can cause various health problems, such as a low red blood cell count (anemia) or nerve damage.  What are the causes?  This condition may be caused by:  · Not eating enough foods that contain vitamin B12.  · Not having enough stomach acid and digestive fluids to properly absorb vitamin B12 from the  food that you eat.  · Certain digestive system diseases that make it hard to absorb vitamin B12. These diseases include Crohn disease, chronic pancreatitis, and cystic fibrosis.  · Pernicious anemia. This is a condition in which the body does not make enough of a protein (intrinsic factor), resulting in too few red blood cells.  · Having a surgery in which part of the stomach or small intestine is removed.  · Taking certain medicines that make it hard for the body to absorb vitamin B12. These medicines include:  ¨ Heartburn medicine (antacids and proton pump inhibitors).  ¨ An antibiotic medicine called neomycin.  ¨ Some medicines that are used to treat diabetes, tuberculosis, gout, or high cholesterol.  What increases the risk?  The following factors may make you more likely to develop a B12 deficiency:  · Being older than age 50.  · Eating a vegetarian or vegan diet, especially while you are pregnant.  · Eating a poor diet while you are pregnant.  · Taking certain drugs.  · Having alcoholism.  What are the signs or symptoms?  In some cases, there are no symptoms of this condition. If the condition leads to anemia or nerve damage, various symptoms can occur, such as:  · Weakness.  · Fatigue.  · Loss of appetite.  · Weight loss.  · Numbness or tingling in your hands and feet.  · Redness and burning of the tongue.  · Confusion or memory problems.  · Depression.  · Sensory problems, such as color blindness, ringing in the ears, or loss of taste.  · Diarrhea or constipation.  · Trouble walking.  If anemia is severe, symptoms can include:  · Shortness of breath.  · Dizziness.  · Rapid heart rate (tachycardia).  How is this diagnosed?  This condition may be diagnosed with a blood test to measure the level of vitamin B12 in your blood. You may have other tests to help find the cause of your vitamin B12 deficiency. These tests may include:  · A complete blood count (CBC). This is a group of tests that measure certain  characteristics of blood cells.  · A blood test to measure intrinsic factor.  · An endoscopy. In this procedure, a thin tube with a camera on the end is used to look into your stomach or intestines.  How is this treated?  Treatment for this condition depends on the cause. Common treatment options include:  · Changing your eating and drinking habits, such as:  ¨ Eating more foods that contain vitamin B12.  ¨ Drinking less alcohol or no alcohol.  · Taking vitamin B12 supplements. Your health care provider will tell you which dosage is best for you.  · Getting vitamin B12 injections.  Follow these instructions at home:  · Take supplements only as told by your health care provider. Follow the directions carefully.  · Get any injections that are prescribed by your health care provider.  Do not miss your appointments.  · Eat lots of healthy foods that contain vitamin B12. Ask your health care provider if you should work with a dietitian. Foods that contain vitamin B12 include:  ¨ Meat.  ¨ Meat from birds (poultry).  ¨ Fish.  ¨ Eggs.  ¨ Cereal and dairy products that are fortified. This means that vitamin B12 has been added to the food. Check the label on the package to see if the food is fortified.  · Do not abuse alcohol.  · Keep all follow-up visits as told by your health care provider. This is important.  Contact a health care provider if:  · Your symptoms come back.  Get help right away if:  · You develop shortness of breath.  · You have chest pain.  · You become dizzy or you lose consciousness.  This information is not intended to replace advice given to you by your health care provider. Make sure you discuss any questions you have with your health care provider.  Document Released: 03/11/2013 Document Revised: 05/31/2017 Document Reviewed: 05/04/2016  Elsevier Interactive Patient Education © 2017 [employer]    Vitamin D Deficiency  Vitamin D deficiency is when your body does not have enough vitamin D. Vitamin D  is important to your body for many reasons:  · It helps the body to absorb two important minerals, called calcium and phosphorus.  · It plays a role in bone health.  · It may help to prevent some diseases, such as diabetes and multiple sclerosis.  · It plays a role in muscle function, including heart function.  You can get vitamin D by:  · Eating foods that naturally contain vitamin D.  · Eating or drinking milk or other dairy products that have vitamin D added to them.  · Taking a vitamin D supplement or a multivitamin supplement that contains vitamin D.  · Being in the sun. Your body naturally makes vitamin D when your skin is exposed to sunlight. Your body changes the sunlight into a form of the vitamin that the body can use.  If vitamin D deficiency is severe, it can cause a condition in which your bones become soft. In adults, this condition is called osteomalacia. In children, this condition is called rickets.  What are the causes?  Vitamin D deficiency may be caused by:  · Not eating enough foods that contain vitamin D.  · Not getting enough sun exposure.  · Having certain digestive system diseases that make it difficult for your body to absorb vitamin D. These diseases include Crohn disease, chronic pancreatitis, and cystic fibrosis.  · Having a surgery in which a part of the stomach or a part of the small intestine is removed.  · Being obese.  · Having chronic kidney disease or liver disease.  What increases the risk?  This condition is more likely to develop in:  · Older people.  · People who do not spend much time outdoors.  · People who live in a long-term care facility.  · People who have had broken bones.  · People with weak or thin bones (osteoporosis).  · People who have a disease or condition that changes how the body absorbs vitamin D.  · People who have dark skin.  · People who take certain medicines, such as steroid medicines or certain seizure medicines.  · People who are overweight or  obese.  What are the signs or symptoms?  In mild cases of vitamin D deficiency, there may not be any symptoms. If the condition is severe, symptoms may include:  · Bone pain.  · Muscle pain.  · Falling often.  · Broken bones caused by a minor injury.  How is this diagnosed?  This condition is usually diagnosed with a blood test.  How is this treated?  Treatment for this condition may depend on what caused the condition. Treatment options include:  · Taking vitamin D supplements.  · Taking a calcium supplement. Your health care provider will suggest what dose is best for you.  Follow these instructions at home:  · Take medicines and supplements only as told by your health care provider.  · Eat foods that contain vitamin D. Choices include:  ¨ Fortified dairy products, cereals, or juices. Fortified means that vitamin D has been added to the food. Check the label on the package to be sure.  ¨ Fatty fish, such as salmon or trout.  ¨ Eggs.  ¨ Oysters.  · Do not use a tanning bed.  · Maintain a healthy weight. Lose weight, if needed.  · Keep all follow-up visits as told by your health care provider. This is important.  Contact a health care provider if:  · Your symptoms do not go away.  · You feel like throwing up (nausea) or you throw up (vomit).  · You have fewer bowel movements than usual or it is difficult for you to have a bowel movement (constipation).  This information is not intended to replace advice given to you by your health care provider. Make sure you discuss any questions you have with your health care provider.  Document Released: 03/11/2013 Document Revised: 05/31/2017 Document Reviewed: 05/04/2016  [employer] Interactive Patient Education © 2017 [employer]    Fat and Cholesterol Restricted Diet  High levels of fat and cholesterol in your blood may lead to various health problems, such as diseases of the heart, blood vessels, gallbladder, liver, and pancreas. Fats are concentrated sources of energy that  come in various forms. Certain types of fat, including saturated fat, may be harmful in excess. Cholesterol is a substance needed by your body in small amounts. Your body makes all the cholesterol it needs. Excess cholesterol comes from the food you eat.  When you have high levels of cholesterol and saturated fat in your blood, health problems can develop because the excess fat and cholesterol will gather along the walls of your blood vessels, causing them to narrow. Choosing the right foods will help you control your intake of fat and cholesterol. This will help keep the levels of these substances in your blood within normal limits and reduce your risk of disease.  WHAT IS MY PLAN?  Your health care provider recommends that you:  · Get no more than __________ % of the total calories in your daily diet from fat.  · Limit your intake of saturated fat to less than ______% of your total calories each day.  · Limit the amount of cholesterol in your diet to less than _________mg per day.  WHAT TYPES OF FAT SHOULD I CHOOSE?  · Choose healthy fats more often. Choose monounsaturated and polyunsaturated fats, such as olive and canola oil, flaxseeds, walnuts, almonds, and seeds.  · Eat more omega-3 fats. Good choices include salmon, mackerel, sardines, tuna, flaxseed oil, and ground flaxseeds. Aim to eat fish at least two times a week.  · Limit saturated fats. Saturated fats are primarily found in animal products, such as meats, butter, and cream. Plant sources of saturated fats include palm oil, palm kernel oil, and coconut oil.  · Avoid foods with partially hydrogenated oils in them. These contain trans fats. Examples of foods that contain trans fats are stick margarine, some tub margarines, cookies, crackers, and other baked goods.  WHAT GENERAL GUIDELINES DO I NEED TO FOLLOW?  These guidelines for healthy eating will help you control your intake of fat and cholesterol:  · Check food labels carefully to identify foods with  "trans fats or high amounts of saturated fat.  · Fill one half of your plate with vegetables and green salads.  · Fill one fourth of your plate with whole grains. Look for the word \"whole\" as the first word in the ingredient list.  · Fill one fourth of your plate with lean protein foods.  · Limit fruit to two servings a day. Choose fruit instead of juice.  · Eat more foods that contain soluble fiber. Examples of foods that contain this type of fiber are apples, broccoli, carrots, beans, peas, and barley. Aim to get 20-30 g of fiber per day.  · Eat more home-cooked food and less restaurant, buffet, and fast food.  · Limit or avoid alcohol.  · Limit foods high in starch and sugar.  · Limit fried foods.  · Cook foods using methods other than frying. Baking, boiling, grilling, and broiling are all great options.  · Lose weight if you are overweight. Losing just 5-10% of your initial body weight can help your overall health and prevent diseases such as diabetes and heart disease.  WHAT FOODS CAN I EAT?  Grains  Whole grains, such as whole wheat or whole grain breads, crackers, cereals, and pasta. Unsweetened oatmeal, bulgur, barley, quinoa, or brown rice. Corn or whole wheat flour tortillas.  Vegetables  Fresh or frozen vegetables (raw, steamed, roasted, or grilled). Green salads.  Fruits  All fresh, canned (in natural juice), or frozen fruits.  Meat and Other Protein Products  Ground beef (85% or leaner), grass-fed beef, or beef trimmed of fat. Skinless chicken or turkey. Ground chicken or turkey. Pork trimmed of fat. All fish and seafood. Eggs. Dried beans, peas, or lentils. Unsalted nuts or seeds. Unsalted canned or dry beans.  Dairy  Low-fat dairy products, such as skim or 1% milk, 2% or reduced-fat cheeses, low-fat ricotta or cottage cheese, or plain low-fat yogurt.  Fats and Oils  Tub margarines without trans fats. Light or reduced-fat mayonnaise and salad dressings. Avocado. Olive, canola, sesame, or safflower " oils. Natural peanut or almond butter (choose ones without added sugar and oil).  The items listed above may not be a complete list of recommended foods or beverages. Contact your dietitian for more options.  WHAT FOODS ARE NOT RECOMMENDED?  Grains  White bread. White pasta. White rice. Cornbread. Bagels, pastries, and croissants. Crackers that contain trans fat.  Vegetables  White potatoes. Corn. Creamed or fried vegetables. Vegetables in a cheese sauce.  Fruits  Dried fruits. Canned fruit in light or heavy syrup. Fruit juice.  Meat and Other Protein Products  Fatty cuts of meat. Ribs, chicken wings, orellana, sausage, bologna, salami, chitterlings, fatback, hot dogs, bratwurst, and packaged luncheon meats. Liver and organ meats.  Dairy  Whole or 2% milk, cream, half-and-half, and cream cheese. Whole milk cheeses. Whole-fat or sweetened yogurt. Full-fat cheeses. Nondairy creamers and whipped toppings. Processed cheese, cheese spreads, or cheese curds.  Sweets and Desserts  Corn syrup, sugars, honey, and molasses. Candy. Jam and jelly. Syrup. Sweetened cereals. Cookies, pies, cakes, donuts, muffins, and ice cream.  Fats and Oils  Butter, stick margarine, lard, shortening, ghee, or orellana fat. Coconut, palm kernel, or palm oils.  Beverages  Alcohol. Sweetened drinks (such as sodas, lemonade, and fruit drinks or punches).  The items listed above may not be a complete list of foods and beverages to avoid. Contact your dietitian for more information.     This information is not intended to replace advice given to you by your health care provider. Make sure you discuss any questions you have with your health care provider.

## 2020-02-14 ENCOUNTER — APPOINTMENT (RX ONLY)
Dept: URBAN - METROPOLITAN AREA CLINIC 22 | Facility: CLINIC | Age: 65
Setting detail: DERMATOLOGY
End: 2020-02-14

## 2020-02-14 DIAGNOSIS — L57.0 ACTINIC KERATOSIS: ICD-10-CM

## 2020-02-14 PROCEDURE — ? LIQUID NITROGEN

## 2020-02-14 PROCEDURE — ? COUNSELING

## 2020-02-14 PROCEDURE — 17000 DESTRUCT PREMALG LESION: CPT

## 2020-02-14 PROCEDURE — 17003 DESTRUCT PREMALG LES 2-14: CPT

## 2020-02-14 ASSESSMENT — LOCATION DETAILED DESCRIPTION DERM
LOCATION DETAILED: LEFT CENTRAL TEMPLE
LOCATION DETAILED: RIGHT SUPERIOR MEDIAL FOREHEAD
LOCATION DETAILED: NASAL DORSUM
LOCATION DETAILED: LEFT INFERIOR MEDIAL FOREHEAD
LOCATION DETAILED: LEFT SUPERIOR MEDIAL FOREHEAD
LOCATION DETAILED: LEFT FOREHEAD
LOCATION DETAILED: RIGHT CENTRAL TEMPLE

## 2020-02-14 ASSESSMENT — LOCATION ZONE DERM
LOCATION ZONE: NOSE
LOCATION ZONE: FACE

## 2020-02-14 ASSESSMENT — LOCATION SIMPLE DESCRIPTION DERM
LOCATION SIMPLE: LEFT FOREHEAD
LOCATION SIMPLE: LEFT TEMPLE
LOCATION SIMPLE: RIGHT TEMPLE
LOCATION SIMPLE: NOSE
LOCATION SIMPLE: RIGHT FOREHEAD

## 2020-02-14 NOTE — PROCEDURE: MIPS QUALITY
Quality 130: Documentation Of Current Medications In The Medical Record: Current Medications Documented
Quality 226: Preventive Care And Screening: Tobacco Use: Screening And Cessation Intervention: Tobacco Screening not Performed for Medical Reasons
Detail Level: Detailed

## 2020-03-24 DIAGNOSIS — I10 ESSENTIAL HYPERTENSION: ICD-10-CM

## 2020-03-24 NOTE — TELEPHONE ENCOUNTER
Pt is looking for a new PCP    Received request via: Patient    Was the patient seen in the last year in this department? Yes    Does the patient have an active prescription (recently filled or refills available) for medication(s) requested? No

## 2020-03-26 RX ORDER — LISINOPRIL AND HYDROCHLOROTHIAZIDE 20; 12.5 MG/1; MG/1
1 TABLET ORAL DAILY
Qty: 90 TAB | Refills: 0 | Status: SHIPPED | OUTPATIENT
Start: 2020-03-26 | End: 2020-04-28 | Stop reason: SDUPTHER

## 2020-04-28 ENCOUNTER — OFFICE VISIT (OUTPATIENT)
Dept: MEDICAL GROUP | Facility: PHYSICIAN GROUP | Age: 65
End: 2020-04-28
Payer: COMMERCIAL

## 2020-04-28 DIAGNOSIS — K22.719 BARRETT'S ESOPHAGUS WITH DYSPLASIA: Chronic | ICD-10-CM

## 2020-04-28 DIAGNOSIS — I10 ESSENTIAL HYPERTENSION: ICD-10-CM

## 2020-04-28 DIAGNOSIS — E55.9 VITAMIN D DEFICIENCY: Chronic | ICD-10-CM

## 2020-04-28 PROBLEM — E78.5 DYSLIPIDEMIA: Chronic | Status: ACTIVE | Noted: 2020-01-09

## 2020-04-28 PROBLEM — E83.42 HYPOMAGNESEMIA: Status: RESOLVED | Noted: 2020-01-09 | Resolved: 2020-04-28

## 2020-04-28 PROBLEM — K21.9 GASTROESOPHAGEAL REFLUX DISEASE WITHOUT ESOPHAGITIS: Chronic | Status: ACTIVE | Noted: 2020-01-09

## 2020-04-28 PROCEDURE — 99442 PR PHYSICIAN TELEPHONE EVALUATION 11-20 MIN: CPT | Mod: CR | Performed by: INTERNAL MEDICINE

## 2020-04-28 RX ORDER — LISINOPRIL AND HYDROCHLOROTHIAZIDE 20; 12.5 MG/1; MG/1
1 TABLET ORAL DAILY
Qty: 90 TAB | Refills: 3 | Status: SHIPPED | OUTPATIENT
Start: 2020-04-28 | End: 2021-01-12 | Stop reason: SDUPTHER

## 2020-04-28 NOTE — PROGRESS NOTES
Telephone Appointment Visit   As a means of avoiding spread of COVID-19, this visit is being conducted by telephone. This telephone visit was initiated by the patient and they verbally consented.    Time at start of call: 9:06 AM    Reason for Call:  Symptom Follow-up   Medication refill      Hypertension.  This is a chronic and stable condition.  The patient is presently taking lisinopril/hydrochlorothiazide.  His blood pressure has been well controlled at home.  Patient is requesting refill of his medication     Ponce's esophagus.  Patient is now followed by GI specialist.  His last EGD was done in 2019 and Ponce's esophagus was noted.  Per patient report GI specialist recommend to repeat endoscopy again in 2021.  Patient denies nausea vomiting abdominal pain dysphagia odynophagia or unexplained weight loss.  He is presently taking omeprazole.  No side effect reported.  Advised the patient to continue follow-up with GI specialist as directed.    Vitamin D deficiency.  Patient currently taking vitamin D supplementation.Stable    Hyperlipidemia.This is a chronic and stable condition.  The patient is not interested in taking any medication.    Labs / Images Reviewed   Recent lab result discussed with the patient.     Assessment and Plan:     1. Ponce's esophagus with dysplasia    2. Essential hypertension    3. Vitamin D deficiency      Plan  Advised the patient to continue taking current medication  Continue follow-up with GI specialist as directed.  Advised the patient regarding low-fat low-cholesterol low-carb diet.  He will continue to exercise on a regular basis and will try to maintain ideal weight.  Refill blood pressure meds today.      Follow-up: Return in about 4 months (around 8/28/2020) for high blood pressure followup, cholesterol followup.    Time at end of call: 9:18 AM  Total Time Spent: 11-20 minutes    Chauncey Winkler M.D.

## 2020-07-16 ENCOUNTER — APPOINTMENT (OUTPATIENT)
Dept: SLEEP MEDICINE | Facility: MEDICAL CENTER | Age: 65
End: 2020-07-16
Payer: COMMERCIAL

## 2020-08-21 NOTE — TELEPHONE ENCOUNTER
Dr Rain- Pt is establishing with you in Sept. Please refill as you see fit.    
Since I have never seen the patient and looking at chart review he was given this blood pressure medication from his previous PCP but his last blood pressure reading was mildly uncontrolled so 30 days of blood pressure medication given with 1 refill and please asked the patient to get an earlier appointment in August with me to establish care for any further refills and if he does not establish care then we cannot give him any more refills so please asked him to make an earlier appointment either in July or August with me to establish care.  Thanks.  
Was the patient seen in the last year in this department? No     Does patient have an active prescription for medications requested? No    Received Request Via: Patient  
oral

## 2021-01-12 ENCOUNTER — OFFICE VISIT (OUTPATIENT)
Dept: MEDICAL GROUP | Facility: PHYSICIAN GROUP | Age: 66
End: 2021-01-12
Payer: COMMERCIAL

## 2021-01-12 VITALS
BODY MASS INDEX: 25.52 KG/M2 | WEIGHT: 168.4 LBS | DIASTOLIC BLOOD PRESSURE: 74 MMHG | HEIGHT: 68 IN | TEMPERATURE: 99.1 F | OXYGEN SATURATION: 96 % | RESPIRATION RATE: 14 BRPM | SYSTOLIC BLOOD PRESSURE: 118 MMHG | HEART RATE: 96 BPM

## 2021-01-12 DIAGNOSIS — E55.9 VITAMIN D DEFICIENCY: Chronic | ICD-10-CM

## 2021-01-12 DIAGNOSIS — E78.5 DYSLIPIDEMIA: Chronic | ICD-10-CM

## 2021-01-12 DIAGNOSIS — K22.719 BARRETT'S ESOPHAGUS WITH DYSPLASIA: Chronic | ICD-10-CM

## 2021-01-12 DIAGNOSIS — Z87.19 HISTORY OF SMALL BOWEL OBSTRUCTION: ICD-10-CM

## 2021-01-12 DIAGNOSIS — I10 ESSENTIAL HYPERTENSION: Chronic | ICD-10-CM

## 2021-01-12 DIAGNOSIS — E53.8 VITAMIN B12 DEFICIENCY: ICD-10-CM

## 2021-01-12 PROBLEM — R06.83 SNORING: Status: RESOLVED | Noted: 2019-09-04 | Resolved: 2021-01-12

## 2021-01-12 PROBLEM — R35.89 POLYURIA: Status: RESOLVED | Noted: 2019-09-04 | Resolved: 2021-01-12

## 2021-01-12 PROBLEM — Z12.5 SCREENING PSA (PROSTATE SPECIFIC ANTIGEN): Status: RESOLVED | Noted: 2019-09-04 | Resolved: 2021-01-12

## 2021-01-12 PROBLEM — Z51.81 ENCOUNTER FOR THERAPEUTIC DRUG MONITORING: Status: RESOLVED | Noted: 2019-09-04 | Resolved: 2021-01-12

## 2021-01-12 PROCEDURE — 99214 OFFICE O/P EST MOD 30 MIN: CPT | Performed by: INTERNAL MEDICINE

## 2021-01-12 RX ORDER — CEPHALEXIN 500 MG/1
CAPSULE ORAL
COMMUNITY
Start: 2020-12-22 | End: 2021-01-12

## 2021-01-12 RX ORDER — OMEPRAZOLE 20 MG/1
20 CAPSULE, DELAYED RELEASE ORAL DAILY
COMMUNITY
End: 2021-05-12

## 2021-01-12 RX ORDER — LISINOPRIL AND HYDROCHLOROTHIAZIDE 20; 12.5 MG/1; MG/1
1 TABLET ORAL DAILY
Qty: 90 TAB | Refills: 3 | Status: SHIPPED | OUTPATIENT
Start: 2021-01-12 | End: 2021-03-09 | Stop reason: SDUPTHER

## 2021-01-12 SDOH — HEALTH STABILITY: MENTAL HEALTH: HOW OFTEN DO YOU HAVE A DRINK CONTAINING ALCOHOL?: 4 OR MORE TIMES A WEEK

## 2021-01-12 ASSESSMENT — FIBROSIS 4 INDEX: FIB4 SCORE: 1.37

## 2021-01-12 ASSESSMENT — PATIENT HEALTH QUESTIONNAIRE - PHQ9: CLINICAL INTERPRETATION OF PHQ2 SCORE: 0

## 2021-01-12 NOTE — ASSESSMENT & PLAN NOTE
Chronic condition currently patient is on lisinopril/hydrochlorothiazide.  Blood pressure has been well controlled.  The patient is requesting refills

## 2021-01-12 NOTE — ASSESSMENT & PLAN NOTE
This is a chronic condition for the patient currently followed by digestive health.  He had EGD done 2019 and GI specialist recommend to repeat the procedures again 2021.  Advised the patient to schedule an appointment to see the GI specialist

## 2021-01-12 NOTE — PROGRESS NOTES
CC: Follow-up hypertension  Hospital follow-up      HPI: This is a 65 y.o. pt.  Pt's medical history is notable for:     History of small bowel obstruction  Patient was admitted to [facility] and underwent exploratory laparotomy, lysis of adhesions on 2020-12-10.  Patient was seen by the surgeon for follow-up.  Patient denies fever chills redness discharge.  The wound is healing well.  Patient denies fever chills abdominal pain rectal bleeding or any change in bowel movement.  He started walking up to 3 miles and stated that his is now back to his normal baseline.    Ponce's esophagus with dysplasia  This is a chronic condition for the patient currently followed by digestive [facility].  He had EGD done 2019 and GI specialist recommend to repeat the procedures again 2021.  Advised the patient to schedule an appointment to see the GI specialist    Dyslipidemia  This is a chronic condition for the patient currently on diet therapy.  Lab tests ordered for follow-up.    Essential hypertension  Chronic condition currently patient is on lisinopril/hydrochlorothiazide.  Blood pressure has been well controlled.  The patient is requesting refills    Vitamin D deficiency  Chronic condition but the patient is due for lab test.          REVIEW OF SYSTEMS:     Constitutional:  no fever / chills   Neurologic: no headaches, no numbness/tingling  Eyes: no changes in vision  ENT: no sore throat, no hearing loss  CV:  no chest pain, no palpitations  Pulmonary: no SOB, no cough        Allergies: Penicillins    Current Outpatient Medications Ordered in Epic   Medication Sig Dispense Refill   • omeprazole (PRILOSEC) 20 MG delayed-release capsule Take 20 mg by mouth every day.     • lisinopril-hydrochlorothiazide (PRINZIDE) 20-12.5 MG per tablet Take 1 Tab by mouth every day. 90 Tab 3   • cyanocobalamin (VITAMIN B-12) 500 MCG Tab Take 500 mcg by mouth every day.     • magnesium oxide (MAG-OX) 400 MG Tab Take 400 mg by mouth every day.      • Cholecalciferol (VITAMIN D-3) 5000 units Tab Take 1 Tab by mouth every day.     • thiamine (THIAMINE) 100 MG Tab Take 100 mg by mouth every day.     • Turmeric 500 MG Tab Take 1 Tab by mouth every day.     • Milk Thistle 250 MG Cap Take 1 Cap by mouth every day.     • vitamin e (VITAMIN E) 400 UNIT Cap Take 400 Units by mouth every day.       No current Psychiatric-ordered facility-administered medications on file.        Past Medical History:   Diagnosis Date   • Ponce esophagus    • Duodenal ulcer    • GERD (gastroesophageal reflux disease)    • Hypertension         Past Surgical History:   Procedure Laterality Date   • EXPLORATORY LAPAROTOMY  12/10/2020    small bowel obstruction  / sp lysis of adhesions   • APPENDECTOMY     • MENISCUS REPAIR      unkown side   • ROTATOR CUFF REPAIR Bilateral         Family History   Problem Relation Age of Onset   • Diabetes Mother    • Heart Failure Mother    • Sleep Apnea Mother    • Lung Cancer Father    • Lung Disease Father    • Heart Disease Paternal Uncle         MI young        Social History     Tobacco Use   Smoking Status Never Smoker   Smokeless Tobacco Former User   • Types: Snuff          Social History     Substance and Sexual Activity   Alcohol Use Yes   • Alcohol/week: 12.6 oz   • Types: 14 Glasses of wine, 7 Shots of liquor per week   • Frequency: 4 or more times a week    Comment: everyday, 2-4 drinks/day         ------------------------------------------------------------------------------     PHYSICAL EXAM:   Vitals:    01/12/21 1129   BP: 118/74   Pulse: 96   Resp: 14   Temp: 37.3 °C (99.1 °F)   SpO2: 96%      Body mass index is 25.61 kg/m².         Constitutional: no acute distress  Neck: supple, no JVD  CV: heart RRR  Resp: normal effort, no wheezing or rales.  GI: abdomen soft.  Well-healed scar from recent surgery.  No significant redness discharge or fluctuance noted  Neuro: CN 2-12 grossly  intact        -----------------------------------------------------------------------------    ASSESSMENT:   1. Dyslipidemia  ALANINE AMINO-TRANS    Lipid Profile   2. Essential hypertension  HEMOGLOBIN A1C    Basic Metabolic Panel    CBC WITH DIFFERENTIAL    PROSTATE SPECIFIC AG SCREENING    MICROALBUMIN CREAT RATIO URINE    TSH    lisinopril-hydrochlorothiazide (PRINZIDE) 20-12.5 MG per tablet   3. Vitamin D deficiency  VITAMIN D,25 HYDROXY   4. Vitamin B12 deficiency  VITAMIN B12   5. History of small bowel obstruction     6. Ponce's esophagus with dysplasia             MEDICAL DECISION MAKING: DISCUSSION / STATUS / PLAN:  Medically the patient appeared to be stable.  Advised the patient to keep the appointment to follow-up with the surgeon in 2 weeks as directed  Refill his blood pressure med order  Advised to continue current medication  Continue follow-up with GI specialist as directed  Lab tests ordered.     Return in about 4 months (around 5/12/2021).       PATIENT EDUCATION:  -If any problems should arise, patient was advised to contact our office or go to ER to be evaluated.      Please note that this dictation was created using voice recognition software. I have made every reasonable attempt to correct obvious errors, but it is possible there are errors of grammar and possibly content that I did not discover before finalizing the note.

## 2021-01-12 NOTE — ASSESSMENT & PLAN NOTE
Patient was admitted to [facility] and underwent exploratory laparotomy, lysis of adhesions on 2020-12-10.  Patient was seen by the surgeon for follow-up.  Patient denies fever chills redness discharge.  The wound is healing well.  Patient denies fever chills abdominal pain rectal bleeding or any change in bowel movement.  He started walking up to 3 miles and stated that his is now back to his normal baseline.

## 2021-01-12 NOTE — ASSESSMENT & PLAN NOTE
This is a chronic condition for the patient currently on diet therapy.  Lab tests ordered for follow-up.

## 2021-01-29 ENCOUNTER — APPOINTMENT (RX ONLY)
Dept: URBAN - METROPOLITAN AREA CLINIC 22 | Facility: CLINIC | Age: 66
Setting detail: DERMATOLOGY
End: 2021-01-29

## 2021-01-29 DIAGNOSIS — D22 MELANOCYTIC NEVI: ICD-10-CM

## 2021-01-29 DIAGNOSIS — Z71.89 OTHER SPECIFIED COUNSELING: ICD-10-CM

## 2021-01-29 DIAGNOSIS — L81.4 OTHER MELANIN HYPERPIGMENTATION: ICD-10-CM

## 2021-01-29 DIAGNOSIS — L82.1 OTHER SEBORRHEIC KERATOSIS: ICD-10-CM

## 2021-01-29 DIAGNOSIS — L71.8 OTHER ROSACEA: ICD-10-CM

## 2021-01-29 PROBLEM — D22.5 MELANOCYTIC NEVI OF TRUNK: Status: ACTIVE | Noted: 2021-01-29

## 2021-01-29 PROCEDURE — ? PRESCRIPTION

## 2021-01-29 PROCEDURE — ? TREATMENT REGIMEN

## 2021-01-29 PROCEDURE — ? COUNSELING

## 2021-01-29 PROCEDURE — 99214 OFFICE O/P EST MOD 30 MIN: CPT

## 2021-01-29 PROCEDURE — ? SUNSCREEN TREATMENT REGIMEN

## 2021-01-29 RX ORDER — METRONIDAZOLE 7.5 MG/G
CREAM TOPICAL
Qty: 1 | Refills: 1 | Status: ERX | COMMUNITY
Start: 2021-01-29

## 2021-01-29 RX ADMIN — METRONIDAZOLE: 7.5 CREAM TOPICAL at 00:00

## 2021-01-29 ASSESSMENT — LOCATION DETAILED DESCRIPTION DERM
LOCATION DETAILED: NASAL DORSUM
LOCATION DETAILED: RIGHT VENTRAL PROXIMAL FOREARM
LOCATION DETAILED: INFERIOR THORACIC SPINE
LOCATION DETAILED: LEFT VENTRAL PROXIMAL FOREARM
LOCATION DETAILED: INFERIOR MID FOREHEAD
LOCATION DETAILED: SUPERIOR THORACIC SPINE
LOCATION DETAILED: RIGHT INFERIOR MEDIAL MALAR CHEEK
LOCATION DETAILED: LOWER STERNUM

## 2021-01-29 ASSESSMENT — LOCATION ZONE DERM
LOCATION ZONE: NOSE
LOCATION ZONE: ARM
LOCATION ZONE: TRUNK
LOCATION ZONE: FACE

## 2021-01-29 ASSESSMENT — LOCATION SIMPLE DESCRIPTION DERM
LOCATION SIMPLE: INFERIOR FOREHEAD
LOCATION SIMPLE: LEFT FOREARM
LOCATION SIMPLE: RIGHT CHEEK
LOCATION SIMPLE: RIGHT FOREARM
LOCATION SIMPLE: UPPER BACK
LOCATION SIMPLE: CHEST
LOCATION SIMPLE: NOSE

## 2021-01-29 NOTE — PROCEDURE: TREATMENT REGIMEN
Action 3: Continue
Action 1: Start
Treatment 1: metronidazole
Sig For Treatment 1 (If Needed): twice daily
Detail Level: Zone

## 2021-02-12 ENCOUNTER — SLEEP CENTER VISIT (OUTPATIENT)
Dept: SLEEP MEDICINE | Facility: MEDICAL CENTER | Age: 66
End: 2021-02-12
Payer: COMMERCIAL

## 2021-02-12 VITALS
OXYGEN SATURATION: 97 % | HEIGHT: 68 IN | RESPIRATION RATE: 16 BRPM | DIASTOLIC BLOOD PRESSURE: 78 MMHG | WEIGHT: 169.5 LBS | BODY MASS INDEX: 25.69 KG/M2 | HEART RATE: 93 BPM | SYSTOLIC BLOOD PRESSURE: 130 MMHG

## 2021-02-12 DIAGNOSIS — I10 ESSENTIAL HYPERTENSION: Chronic | ICD-10-CM

## 2021-02-12 DIAGNOSIS — G47.33 OSA (OBSTRUCTIVE SLEEP APNEA): ICD-10-CM

## 2021-02-12 PROCEDURE — 99203 OFFICE O/P NEW LOW 30 MIN: CPT | Performed by: INTERNAL MEDICINE

## 2021-02-12 ASSESSMENT — FIBROSIS 4 INDEX: FIB4 SCORE: 1.37

## 2021-02-12 NOTE — PROGRESS NOTES
"Chief Complaint   Patient presents with   • New Patient     referred 9/4/19 by Niles Rain M.D. for Sleep apnea, snoring       HPI: This patient is a pleasant 65 y.o. male who is referred for sleep apnea evaluation.  His wife has noted snoring and periodic apneas for many years.  He himself is unaware of symptoms and feels he sleeps well.  In terms of sleep habits, he gets to bed by 8:30 PM, falling asleep instantly.  Generally sleeps through the night and wakes up by 4:30 AM, feeling rested.  Denies morning dry mouth or headaches.  He describes himself as \"a morning person\".  He will periodically nap in the day however generally has good energy level.  He exercises routinely and takes care of his grandchildren.  Weight has been stable.  He drinks 2 to 3 glasses of wine around dinner and his wife notes resolved snoring when he quits drinking.  He chews tobacco.  Has GERD/ Ponce's esophagus with resolved reflux symptoms on PPI therapy.  His mother had JAYY and uses CPAP.      Past Medical History:   Diagnosis Date   • Apnea, sleep    • Ponce esophagus    • Daytime sleepiness    • Duodenal ulcer    • GERD (gastroesophageal reflux disease)    • Hypertension    • Snoring        Social History     Socioeconomic History   • Marital status:      Spouse name: Not on file   • Number of children: Not on file   • Years of education: Not on file   • Highest education level: Not on file   Occupational History   • Not on file   Tobacco Use   • Smoking status: Never Smoker   • Smokeless tobacco: Former User     Types: Snuff   Substance and Sexual Activity   • Alcohol use: Yes     Alcohol/week: 12.6 oz     Types: 14 Glasses of wine, 7 Shots of liquor per week     Comment: everyday, 2-4 drinks/day   • Drug use: No   • Sexual activity: Yes     Partners: Female   Other Topics Concern   • Not on file   Social History Narrative    Patient is  with one adult son. 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  Enjoying " his senior living.  He is very independent.  No social or domestic concerns.     Social Determinants of Health     Financial Resource Strain:    • Difficulty of Paying Living Expenses:    Food Insecurity:    • Worried About Running Out of Food in the Last Year:    • Ran Out of Food in the Last Year:    Transportation Needs:    • Lack of Transportation (Medical):    • Lack of Transportation (Non-Medical):    Physical Activity:    • Days of Exercise per Week:    • Minutes of Exercise per Session:    Stress:    • Feeling of Stress :    Social Connections:    • Frequency of Communication with Friends and Family:    • Frequency of Social Gatherings with Friends and Family:    • Attends [employer] Services:    • Active Member of Clubs or Organizations:    • Attends Club or Organization Meetings:    • Marital Status:    Intimate Partner Violence:    • Fear of Current or Ex-Partner:    • Emotionally Abused:    • Physically Abused:    • Sexually Abused:        Family History   Problem Relation Age of Onset   • Diabetes Mother    • Heart Failure Mother    • Sleep Apnea Mother    • Lung Cancer Father    • Lung Disease Father    • Heart Disease Paternal Uncle         MI young       Current Outpatient Medications on File Prior to Visit   Medication Sig Dispense Refill   • metronidazole (METROCREAM) 0.75 % cream APPLY TO PIMPLE BUMPS ON FACE DAILY     • omeprazole (PRILOSEC) 20 MG delayed-release capsule Take 20 mg by mouth every day.     • lisinopril-hydrochlorothiazide (PRINZIDE) 20-12.5 MG per tablet Take 1 Tab by mouth every day. 90 Tab 3   • cyanocobalamin (VITAMIN B-12) 500 MCG Tab Take 500 mcg by mouth every day.     • magnesium oxide (MAG-OX) 400 MG Tab Take 400 mg by mouth every day.     • Cholecalciferol (VITAMIN D-3) 5000 units Tab Take 1 Tab by mouth every day.     • thiamine (THIAMINE) 100 MG Tab Take 100 mg by mouth every day.     • Turmeric 500 MG Tab Take 1 Tab by mouth every day.     • Milk Thistle 250 MG Cap Take 1  "Cap by mouth every day.     • vitamin e (VITAMIN E) 400 UNIT Cap Take 400 Units by mouth every day.       No current facility-administered medications on file prior to visit.       Allergies: Penicillins    ROS:   Constitutional: Denies fevers, chills, night sweats, fatigue or weight loss  Eyes: Denies vision loss, pain, drainage, double vision  Ears, Nose, Throat: Denies earache, difficulty hearing, tinnitus, nasal congestion, hoarseness  Cardiovascular: Denies chest pain, tightness, palpitations, orthopnea or edema  Respiratory: Denies shortness of breath, cough, wheezing, hemoptysis  Sleep:As in HPI  GI: Denies heartburn, dysphagia, nausea, abdominal pain, diarrhea or constipation  : Denies frequent urination, hematuria, discharge or painful urination  Musculoskeletal: Denies back pain, painful joints, sore muscles  Neurological: Denies weakness or headaches  Skin: No rashes    /78 (BP Location: Right arm, Patient Position: Sitting, BP Cuff Size: Adult)   Pulse 93   Resp 16   Ht 1.727 m (5' 8\")   Wt 76.9 kg (169 lb 8 oz)   SpO2 97%     Physical Exam:  Appearance: Well-nourished, well-developed, in no acute distress  HEENT: Normocephalic, atraumatic, white sclera, PERRLA, oropharynx clear, Mallampati 2  Neck: No masses  Respiratory: no intercostal retractions or accessory muscle use  Lungs auscultation: Clear to auscultation bilaterally  Cardiovascular: Regular rate rhythm. No murmurs, rubs or gallops.  No LE edema  Abdomen: soft, nondistended  Gait: Normal  Digits: No clubbing, cyanosis  Motor: No focal deficits  Orientation: Oriented to time, person and place    Diagnosis:  1. JAYY (obstructive sleep apnea)  Overnight Home Sleep Study   2. Essential hypertension         Plan:  The patient has snoring and witnessed apneas by his wife, although denies disrupted or nonrestorative sleep.  He has a family history of JAYY.  Recommend home polysomnography for screening of JAYY.  We also discussed the " correlation of alcohol and snoring/sleep apnea, and he was advised to avoid drinking within at least 3 hours of bedtime.  Return for after sleep study.

## 2021-03-03 ENCOUNTER — HOME STUDY (OUTPATIENT)
Dept: SLEEP MEDICINE | Facility: MEDICAL CENTER | Age: 66
End: 2021-03-03
Attending: INTERNAL MEDICINE
Payer: COMMERCIAL

## 2021-03-03 DIAGNOSIS — G47.33 OSA (OBSTRUCTIVE SLEEP APNEA): ICD-10-CM

## 2021-03-09 DIAGNOSIS — I10 ESSENTIAL HYPERTENSION: Chronic | ICD-10-CM

## 2021-03-09 RX ORDER — LISINOPRIL AND HYDROCHLOROTHIAZIDE 20; 12.5 MG/1; MG/1
1 TABLET ORAL DAILY
Qty: 90 TABLET | Refills: 3 | Status: SHIPPED | OUTPATIENT
Start: 2021-03-09 | End: 2021-05-12 | Stop reason: SDUPTHER

## 2021-03-10 PROCEDURE — 95806 SLEEP STUDY UNATT&RESP EFFT: CPT | Performed by: INTERNAL MEDICINE

## 2021-03-11 NOTE — PROCEDURES
The patient is a 65-year-old male with snoring and witnessed apneas.  Home polysomnography requested for evaluation of suspected obstructive sleep apnea syndrome.    A total recording time of 604 minutes was obtained, with good-quality data noted.    There were 258 snore episodes associated with obstructive apneas and hypopneas.  Overall KRISSY was 58/h and associated with oxygen desaturations to a filippo of 76%.  He spent 33% of the night or 203 minutes below SPO2 90%.  Mean heart rate was 79 BPM.    Interpretation:  Severe JAYY, KRISSY: 58/h with desaturations to 76% SPO2.    Recommendations:  CPAP therapy is considered the optimal treatment for severe JAYY.  An in laboratory CPAP titration polysomnogram is advised.

## 2021-03-24 ENCOUNTER — OFFICE VISIT (OUTPATIENT)
Dept: SLEEP MEDICINE | Facility: MEDICAL CENTER | Age: 66
End: 2021-03-24
Payer: COMMERCIAL

## 2021-03-24 VITALS
WEIGHT: 169 LBS | SYSTOLIC BLOOD PRESSURE: 136 MMHG | DIASTOLIC BLOOD PRESSURE: 78 MMHG | HEART RATE: 85 BPM | BODY MASS INDEX: 25.61 KG/M2 | OXYGEN SATURATION: 97 % | HEIGHT: 68 IN

## 2021-03-24 DIAGNOSIS — G47.33 OSA (OBSTRUCTIVE SLEEP APNEA): ICD-10-CM

## 2021-03-24 DIAGNOSIS — I10 ESSENTIAL HYPERTENSION: ICD-10-CM

## 2021-03-24 PROCEDURE — 99213 OFFICE O/P EST LOW 20 MIN: CPT | Performed by: NURSE PRACTITIONER

## 2021-03-24 ASSESSMENT — FIBROSIS 4 INDEX: FIB4 SCORE: 1.37

## 2021-03-24 NOTE — PROGRESS NOTES
Chief Complaint   Patient presents with   • Follow-Up     JAYY-Last seen 02/21/2021   • Results     HST 03/03/2021       HPI:      Mr. Salas is a 64 y/o male patient who is in today for JAYY f/u and sleep study results. PMH includes hypertension, Ponce's esophagus with dysplasia, GERD, alcohol use, ED, dyslipidemia, vitamin D, vitamin B12 deficiency, JAYY, snoring, never smoker, former chewing tobacco user.     He goes to bed at 8 pm and wakes up between 4-6 am. He denies morning headache, but reports snoring.  For the most part he sleeps pretty well but occasionally he does have some nights when he does wake up more frequently.  He states that he consumes 2 to 3 glasses of wine each night while he prepares dinner.  He is aware that he should cut back on his alcohol use.  He does consume a healthy diet and tries to exercise daily.  He performs CrossFit exercises 5 days a week for 30 to 45 minutes and also uses a rowing machine as well as a stationary bike.  He goes on hikes and walks often with his wife.  He follows up with his PCP for blood pressure management which has been stable.  He denies any new health problems or medications.    Sleep Study History:   HSS from 3/3/21 indicated severe JAYY, KRISSY: 58/h with desaturations to 76% SPO2. He spent 33% of the night or 203 minutes below SPO2 90%.  Mean heart rate was 79 BPM.    ROS:    Constitutional: Denies fevers, Denies weight changes  Eyes: Denies changes in vision, no eye pain  Ears/Nose/Throat/Mouth: Denies nasal congestion or sore throat   Cardiovascular: Denies chest pain or palpitations   Respiratory: Denies shortness of breath , Denies cough  Gastrointestinal/Hepatic: Denies abdominal pain, nausea, vomiting,   Skin/Breast: Denies rash,   Neurological: Denies headache, confusion,   Psychiatric: denies mood disorder   Sleep: + snoring       Past Medical History:   Diagnosis Date   • Apnea, sleep    • Ponce esophagus    • Daytime sleepiness    • Duodenal ulcer     • GERD (gastroesophageal reflux disease)    • Hypertension    • Snoring        Past Surgical History:   Procedure Laterality Date   • EXPLORATORY LAPAROTOMY  12/10/2020    small bowel obstruction  / sp lysis of adhesions   • APPENDECTOMY     • MENISCUS REPAIR      unkown side   • ROTATOR CUFF REPAIR Bilateral        Family History   Problem Relation Age of Onset   • Diabetes Mother    • Heart Failure Mother    • Sleep Apnea Mother    • Lung Cancer Father    • Lung Disease Father    • Heart Disease Paternal Uncle         MI young       Social History     Socioeconomic History   • Marital status:      Spouse name: Not on file   • Number of children: Not on file   • Years of education: Not on file   • Highest education level: Not on file   Occupational History   • Not on file   Tobacco Use   • Smoking status: Never Smoker   • Smokeless tobacco: Former User     Types: Snuff   Substance and Sexual Activity   • Alcohol use: Yes     Alcohol/week: 12.6 oz     Types: 14 Glasses of wine, 7 Shots of liquor per week     Comment: everyday, 2-4 drinks/day   • Drug use: No   • Sexual activity: Yes     Partners: Female   Other Topics Concern   • Not on file   Social History Narrative    Patient is  with one adult son. 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  He is very independent.  No social or domestic concerns.     Social Determinants of Health     Financial Resource Strain:    • Difficulty of Paying Living Expenses:    Food Insecurity:    • Worried About Running Out of Food in the Last Year:    • Ran Out of Food in the Last Year:    Transportation Needs:    • Lack of Transportation (Medical):    • Lack of Transportation (Non-Medical):    Physical Activity:    • Days of Exercise per Week:    • Minutes of Exercise per Session:    Stress:    • Feeling of Stress :    Social Connections:    • Frequency of Communication with Friends and Family:    • Frequency of Social Gatherings with  Friends and Family:    • Attends [employer] Services:    • Active Member of Clubs or Organizations:    • Attends Club or Organization Meetings:    • Marital Status:    Intimate Partner Violence:    • Fear of Current or Ex-Partner:    • Emotionally Abused:    • Physically Abused:    • Sexually Abused:        Allergies as of 03/24/2021 - Reviewed 03/24/2021   Allergen Reaction Noted   • Penicillins  08/06/2012        Vitals:  Vitals:    03/24/21 0909   BP: 136/78   Pulse: 85   SpO2: 97%       Current medications as of today   Current Outpatient Medications   Medication Sig Dispense Refill   • lisinopril-hydrochlorothiazide (PRINZIDE) 20-12.5 MG per tablet Take 1 tablet by mouth every day. 90 tablet 3   • omeprazole (PRILOSEC) 20 MG delayed-release capsule Take 20 mg by mouth every day.     • cyanocobalamin (VITAMIN B-12) 500 MCG Tab Take 500 mcg by mouth every day.     • magnesium oxide (MAG-OX) 400 MG Tab Take 400 mg by mouth every day.     • Cholecalciferol (VITAMIN D-3) 5000 units Tab Take 1 Tab by mouth every day.     • thiamine (THIAMINE) 100 MG Tab Take 100 mg by mouth every day.     • Turmeric 500 MG Tab Take 1 Tab by mouth every day.     • Milk Thistle 250 MG Cap Take 1 Cap by mouth every day.     • vitamin e (VITAMIN E) 400 UNIT Cap Take 400 Units by mouth every day.     • metronidazole (METROCREAM) 0.75 % cream APPLY TO PIMPLE BUMPS ON FACE DAILY       No current facility-administered medications for this visit.         Physical Exam: Limited by COVID-19 precautions.  Appearance: Well developed, well nourished, no acute distress  Eyes: PERRL, EOM intact, sclera white, conjunctiva moist  Ears: no lesions or deformities  Hearing: grossly intact  Nose: no lesions or deformities  Respiratory effort: no intercostal retractions or use of accessory muscles  Extremities: no cyanosis or edema  Abdomen: soft   Gait and Station: normal  Digits and nails: no clubbing, cyanosis, petechiae or nodes.  Cranial nerves:  grossly intact  Skin: no visible rashes, lesions or ulcers noted  Orientation: Oriented to time, person and place  Mood and affect: mood and affect appropriate, normal interaction with examiner  Judgement: Intact    Assessment:  1. JAYY (obstructive sleep apnea)     2. Essential hypertension     3. BMI 25.0-25.9,adult           Plan  Discussed the cardiovascular and neuropsychiatric risks of untreated JAYY; including but not limited to: HTN, DM, MI, ASCVD, CVA, CHF, traffic accidents.     1. HSS from 3/3/21 indicated severe JAYY, KRISSY: 58/h with desaturations to 76% SPO2. He spent 33% of the night or 203 minutes below SPO2 90%.  Mean heart rate was 79 BPM.  Recommendation:  CPAP therapy is considered the optimal treatment for severe JAYY.  We reviewed other alternative treatment options such as mandibular advancement device and UPPP however these are not recommended for severe apnea.  Patient was advised to avoid the supine position, alcohol, sedatives and opioids as all exacerbate apnea.    2. Patient is amenable to auto CPAP trial.  DME order ([facility]) for autoCPAP 5-15 cmH2O, mask (MOC) and supplies was provided today. Clean mask and supplies weekly with soap and water, and change supplies per insurance guidelines. Advised patient to use the CPAP every night for more than four hours for optimal health benefit and to meet the health insurance 70% compliance guideline. Advised patient he may change the mask out if needed within the first 30 days after he receives his equipment.   3. Continue to stay active.     4. Follow up with the appropriate healthcare practitioners for all other medical problems and issues.  5. Sleep hygiene discussed. Recommend keeping a set sleep/wake schedule. Logging enough hours of sleep. Limiting/Avoiding naps. No caffeine after noon and no heavy meals in the evening.   6. Continue to f/u with PCP for BP management, take BP medication as directed and check BP at home.   7. F/u in 3 months.        NIXON Paz.      This dictation was created using voice recognition software. The accuracy of the dictation is limited to the abilities of the software. I expect there may be some errors of grammar and possibly content.

## 2021-03-24 NOTE — PATIENT INSTRUCTIONS
Sleep Apnea  Sleep apnea affects breathing during sleep. It causes breathing to stop for a short time or to become shallow. It can also increase the risk of:  · Heart attack.  · Stroke.  · Being very overweight (obese).  · Diabetes.  · Heart failure.  · Irregular heartbeat.  The goal of treatment is to help you breathe normally again.  What are the causes?  There are three kinds of sleep apnea:  · Obstructive sleep apnea. This is caused by a blocked or collapsed airway.  · Central sleep apnea. This happens when the brain does not send the right signals to the muscles that control breathing.  · Mixed sleep apnea. This is a combination of obstructive and central sleep apnea.  The most common cause of this condition is a collapsed or blocked airway. This can happen if:  · Your throat muscles are too relaxed.  · Your tongue and tonsils are too large.  · You are overweight.  · Your airway is too small.  What increases the risk?  · Being overweight.  · Smoking.  · Having a small airway.  · Being older.  · Being male.  · Drinking alcohol.  · Taking medicines to calm yourself (sedatives or tranquilizers).  · Having family members with the condition.  What are the signs or symptoms?  · Trouble staying asleep.  · Being sleepy or tired during the day.  · Getting angry a lot.  · Loud snoring.  · Headaches in the morning.  · Not being able to focus your mind (concentrate).  · Forgetting things.  · Less interest in sex.  · Mood swings.  · Personality changes.  · Feelings of sadness (depression).  · Waking up a lot during the night to pee (urinate).  · Dry mouth.  · Sore throat.  How is this diagnosed?  · Your medical history.  · A physical exam.  · A test that is done when you are sleeping (sleep study). The test is most often done in a sleep lab but may also be done at home.  How is this treated?    · Sleeping on your side.  · Using a medicine to get rid of mucus in your nose (decongestant).  · Avoiding the use of alcohol,  medicines to help you relax, or certain pain medicines (narcotics).  · Losing weight, if needed.  · Changing your diet.  · Not smoking.  · Using a machine to open your airway while you sleep, such as:  ? An oral appliance. This is a mouthpiece that shifts your lower jaw forward.  ? A CPAP device. This device blows air through a mask when you breathe out (exhale).  ? An EPAP device. This has valves that you put in each nostril.  ? A BPAP device. This device blows air through a mask when you breathe in (inhale) and breathe out.  · Having surgery if other treatments do not work.  It is important to get treatment for sleep apnea. Without treatment, it can lead to:  · High blood pressure.  · Coronary artery disease.  · In men, not being able to have an erection (impotence).  · Reduced thinking ability.  Follow these instructions at home:  Lifestyle  · Make changes that your doctor recommends.  · Eat a healthy diet.  · Lose weight if needed.  · Avoid alcohol, medicines to help you relax, and some pain medicines.  · Do not use any products that contain nicotine or tobacco, such as cigarettes, e-cigarettes, and chewing tobacco. If you need help quitting, ask your doctor.  General instructions  · Take over-the-counter and prescription medicines only as told by your doctor.  · If you were given a machine to use while you sleep, use it only as told by your doctor.  · If you are having surgery, make sure to tell your doctor you have sleep apnea. You may need to bring your device with you.  · Keep all follow-up visits as told by your doctor. This is important.  Contact a doctor if:  · The machine that you were given to use during sleep bothers you or does not seem to be working.  · You do not get better.  · You get worse.  Get help right away if:  · Your chest hurts.  · You have trouble breathing in enough air.  · You have an uncomfortable feeling in your back, arms, or stomach.  · You have trouble talking.  · One side of your  body feels weak.  · A part of your face is hanging down.  These symptoms may be an emergency. Do not wait to see if the symptoms will go away. Get medical help right away. Call your local emergency services (911 in the [place] Do not drive yourself to the hospital.  Summary  · This condition affects breathing during sleep.  · The most common cause is a collapsed or blocked airway.  · The goal of treatment is to help you breathe normally while you sleep.  This information is not intended to replace advice given to you by your health care provider. Make sure you discuss any questions you have with your health care provider.  Document Released: 09/26/2009 Document Revised: 10/04/2019 Document Reviewed: 08/13/2019  Elsevier Patient Education © 2020 [employer]

## 2021-03-31 NOTE — PROGRESS NOTES
cc: Hypertension, Ponce's esophagus, dyslipidemia, GERD, vitamin B12 deficiency, vitamin D deficiency, hypomagnesemia,    Subjective:     Demarcus Salas is a 64 y.o. male presenting he has scheduled for sleep referral, he did see the dermatologist and they did do cryotherapy and also they did do a biopsy and he will be seeing them back.  He has been checking his blood pressure at home but he is not sure if his machine is well calibrated and his blood pressure is within normal limits today at clinic however sometimes at home he does have systolics in 130s and 140s.  He does get off and on heartburn.  He has been changing his diet and he would like to avoid omeprazole as he has been on it in the past but read some literature and he has talked to me about it in length as well as his GI doctor and he did get a prescription when he got his endoscopy done from the GI to start taking omeprazole for his Ponce's and so he does have the prescription and he will consider probably starting this soon.  When he did see the gastroenterologist he was not due for his colonoscopy for another 3 years but he was due for his endoscopy so they did do his endoscopy and he does still have Ponce's esophagus so he is watching his diet.  He does not do too much Advil or Tylenol.  He quit chewing tobacco 10 years ago.  So he still having 2 glasses of wine during dinner time.  He does not have any sodas.  He does have 2 to 3 cups of coffee every morning.  He did have his lab work done November 14 and his kidney GFR is greater than 60, CBC within normal limits, CMP within normal limits, TSH thyroid within normal limits, lipid panel is increased LDL is at 141 and HDL 58, triglycerides normal at 94 and total cholesterol high at 218,  Vitamin B-12 low at 295, vitamin D low at 24, magnesium low at 1.6, hepatitis panel negative, PSA 1.83 within normal limits.  Review of systems:     Constitutional: Negative for fever, chills and negative fatigue.    HENT: Negative for sinus pressure  Eyes: Negative for blurriness  Respiratory: Negative for cough and shortness of breath, negative for exertional shortness of breath  Cardiovascular: Negative for leg swelling, negative for palpitations, negative for chest pain  Gastrointestinal: Negative for nausea, vomiting, abdominal pain, constipation and diarrhea.  Genitourinary: Negative for dysuria and hematuria.   Skin: Negative for rash.   Neurological: Negative for dizziness, focal weakness and headaches.   Endo/Heme/Allergies: Denies bleeding, bruising, and recurrent allergies.  Psychiatric/Behavioral: Negative for depression and anxiety.        Current Outpatient Medications:   •  omeprazole (PRILOSEC) 40 MG delayed-release capsule, Take 40 mg by mouth every day., Disp: , Rfl:   •  cyanocobalamin (VITAMIN B-12) 500 MCG Tab, Take 500 mcg by mouth every day., Disp: , Rfl:   •  magnesium oxide (MAG-OX) 400 MG Tab, Take 400 mg by mouth every day., Disp: , Rfl:   •  Cholecalciferol (VITAMIN D-3) 5000 units Tab, Take 1 Tab by mouth every day., Disp: , Rfl:   •  atorvastatin (LIPITOR) 20 MG Tab, Take 1 Tab by mouth every bedtime., Disp: 90 Tab, Rfl: 1  •  lisinopril-hydrochlorothiazide (PRINZIDE) 20-12.5 MG per tablet, Take 1 Tab by mouth every day., Disp: 90 Tab, Rfl: 0  •  thiamine (THIAMINE) 100 MG Tab, Take 100 mg by mouth every day., Disp: , Rfl:   •  Acetylcysteine (NAC) 600 MG Cap, Take 1 Cap by mouth every day., Disp: , Rfl:   •  Turmeric 500 MG Tab, Take 1 Tab by mouth every day., Disp: , Rfl:   •  Milk Thistle 250 MG Cap, Take 1 Cap by mouth every day., Disp: , Rfl:   •  vitamin e (VITAMIN E) 400 UNIT Cap, Take 400 Units by mouth every day., Disp: , Rfl:     Allergies, past medical history, past surgical history, family history, social history reviewed and updated    Objective:     Vitals: /62 (BP Location: Left arm, Patient Position: Sitting, BP Cuff Size: Adult)   Pulse 98   Temp 36.7 °C (98.1 °F)  "(Temporal)   Resp 14   Ht 1.727 m (5' 8\")   Wt 77.1 kg (170 lb)   SpO2 95%   BMI 25.85 kg/m²   General: Alert, pleasant, NAD  HEENT: Normocephalic.  Nontraumatic. EOMI, no icterus or pallor.  Conjunctivae and lids normal. External ears normal.   Heart: Regular rate and rhythm.  S1 and S2 normal.  No murmurs appreciated.  Respiratory: Normal respiratory effort.  Clear to auscultation bilaterally.  Skin: Warm, dry, no rashes.  Musculoskeletal: Gait is normal.  Moves all extremities well.  Extremities: No leg edema.    Psych:  Affect/mood is normal, judgement is good, memory is intact, grooming is appropriate.    Assessment/Plan:     Diagnoses and all orders for this visit:    Ponce's esophagus with dysplasia    Essential hypertension  -     lisinopril-hydrochlorothiazide (PRINZIDE) 20-12.5 MG per tablet; Take 1 Tab by mouth every day.    Medication monitoring encounter    Multiple atypical skin moles    Sleep apnea, unspecified type    Gastroesophageal reflux disease without esophagitis    Dyslipidemia  -     atorvastatin (LIPITOR) 20 MG Tab; Take 1 Tab by mouth every bedtime.    Vitamin B12 deficiency    Vitamin D deficiency    Hypomagnesemia    -He did have his lab work done November 14 and his kidney GFR is greater than 60, CBC within normal limits, CMP within normal limits, TSH thyroid within normal limits, lipid panel is increased LDL is at 141 and HDL 58, triglycerides normal at 94 and total cholesterol high at 218,  Vitamin B-12 low at 295, vitamin D low at 24, magnesium low at 1.6, hepatitis panel negative, PSA 1.83 within normal limits.  -Please increase your vitamin D and take 4000 or 5000 units of vitamin D over-the-counter daily and vitamin B12 500 mcg daily and magnesium oxide 400 mg daily but magnesium could cause loose stools so take that slowly.  Also I would recommend for the Ponce's to do the omeprazole 40 mg and or 20 mg as advised by the GI specialist.  Also due to risk factors of the " heart disease and your blood pressure we will start atorvastatin 20 mg daily and he will read patient instruction section as his cholesterol lipid panel was increased his LDL.  Also for now we will keep his blood pressure medication the same lisinopril/hydrochlorothiazide 20/12.5 mg tablet 1 daily and he will do his blood pressure logs 2-3 times a week sometimes morning sometimes night and write down please your systolic and diastolic blood pressure and heart rate for ideal blood pressure less than 130s over 90s and today he is doing fantastic but he is concerned that his blood pressure cuff is not calibrated properly so he will bring it to his next appointment to see me 1 more time before I leave in about 4 weeks and he will bring this to this appointment so we could compare to the 1 at clinic to see if there is any difference otherwise he will continue to see the dermatologist and sleep doctor and gastroenterologist as needed and has all the referrals for that.  He does not need any more lab work before his next appointment and his next lab work we will do in about 6 months and I will give him that slip at his next appointment and we will see how his blood pressure is doing in case we need to increase or decrease it otherwise he is doing well with no other symptoms or issues and we will see him back to make sure his blood pressure is doing okay as his home readings are a bit more elevated than the readings here but he is currently asymptomatic.  He is aware if there is any chest pain, shortness of breath, passing out then please go to the ER call 911 otherwise we will see him back in about 4 weeks or before I leave to go over his blood pressure and he will start taking his new medication for his cholesterol and his Ponce's and his new set of vitamins and we will repeat his lab work in about 6 months.    Return in about 4 weeks (around 2/6/2020), or Blood pressure check with his machine.           36.7

## 2021-04-07 ENCOUNTER — IMMUNIZATION (OUTPATIENT)
Dept: FAMILY PLANNING/WOMEN'S HEALTH CLINIC | Facility: IMMUNIZATION CENTER | Age: 66
End: 2021-04-07
Payer: COMMERCIAL

## 2021-04-07 DIAGNOSIS — Z23 ENCOUNTER FOR VACCINATION: Primary | ICD-10-CM

## 2021-04-07 PROCEDURE — 91300 PFIZER SARS-COV-2 VACCINE: CPT

## 2021-04-07 PROCEDURE — 0001A PFIZER SARS-COV-2 VACCINE: CPT

## 2021-05-04 ENCOUNTER — HOSPITAL ENCOUNTER (OUTPATIENT)
Dept: LAB | Facility: MEDICAL CENTER | Age: 66
End: 2021-05-04
Attending: INTERNAL MEDICINE
Payer: COMMERCIAL

## 2021-05-04 DIAGNOSIS — I10 ESSENTIAL HYPERTENSION: Chronic | ICD-10-CM

## 2021-05-04 DIAGNOSIS — E55.9 VITAMIN D DEFICIENCY: Chronic | ICD-10-CM

## 2021-05-04 DIAGNOSIS — E78.5 DYSLIPIDEMIA: Chronic | ICD-10-CM

## 2021-05-04 DIAGNOSIS — E53.8 VITAMIN B12 DEFICIENCY: ICD-10-CM

## 2021-05-04 LAB
ALT SERPL-CCNC: 22 U/L (ref 2–50)
ANION GAP SERPL CALC-SCNC: 10 MMOL/L (ref 7–16)
BASOPHILS # BLD AUTO: 0.8 % (ref 0–1.8)
BASOPHILS # BLD: 0.06 K/UL (ref 0–0.12)
BUN SERPL-MCNC: 14 MG/DL (ref 8–22)
CALCIUM SERPL-MCNC: 9.8 MG/DL (ref 8.5–10.5)
CHLORIDE SERPL-SCNC: 100 MMOL/L (ref 96–112)
CHOLEST SERPL-MCNC: 220 MG/DL (ref 100–199)
CO2 SERPL-SCNC: 27 MMOL/L (ref 20–33)
CREAT SERPL-MCNC: 0.91 MG/DL (ref 0.5–1.4)
CREAT UR-MCNC: 89.46 MG/DL
EOSINOPHIL # BLD AUTO: 0.28 K/UL (ref 0–0.51)
EOSINOPHIL NFR BLD: 3.7 % (ref 0–6.9)
ERYTHROCYTE [DISTWIDTH] IN BLOOD BY AUTOMATED COUNT: 48.4 FL (ref 35.9–50)
EST. AVERAGE GLUCOSE BLD GHB EST-MCNC: 105 MG/DL
FASTING STATUS PATIENT QL REPORTED: NORMAL
GLUCOSE SERPL-MCNC: 99 MG/DL (ref 65–99)
HBA1C MFR BLD: 5.3 % (ref 4–5.6)
HCT VFR BLD AUTO: 49.6 % (ref 42–52)
HDLC SERPL-MCNC: 53 MG/DL
HGB BLD-MCNC: 16.3 G/DL (ref 14–18)
IMM GRANULOCYTES # BLD AUTO: 0.03 K/UL (ref 0–0.11)
IMM GRANULOCYTES NFR BLD AUTO: 0.4 % (ref 0–0.9)
LDLC SERPL CALC-MCNC: 144 MG/DL
LYMPHOCYTES # BLD AUTO: 1.9 K/UL (ref 1–4.8)
LYMPHOCYTES NFR BLD: 25.3 % (ref 22–41)
MCH RBC QN AUTO: 31.7 PG (ref 27–33)
MCHC RBC AUTO-ENTMCNC: 32.9 G/DL (ref 33.7–35.3)
MCV RBC AUTO: 96.5 FL (ref 81.4–97.8)
MICROALBUMIN UR-MCNC: <1.2 MG/DL
MICROALBUMIN/CREAT UR: NORMAL MG/G (ref 0–30)
MONOCYTES # BLD AUTO: 1.23 K/UL (ref 0–0.85)
MONOCYTES NFR BLD AUTO: 16.4 % (ref 0–13.4)
NEUTROPHILS # BLD AUTO: 4.01 K/UL (ref 1.82–7.42)
NEUTROPHILS NFR BLD: 53.4 % (ref 44–72)
NRBC # BLD AUTO: 0 K/UL
NRBC BLD-RTO: 0 /100 WBC
PLATELET # BLD AUTO: 323 K/UL (ref 164–446)
PMV BLD AUTO: 10.9 FL (ref 9–12.9)
POTASSIUM SERPL-SCNC: 4.6 MMOL/L (ref 3.6–5.5)
PSA SERPL-MCNC: 2.01 NG/ML (ref 0–4)
RBC # BLD AUTO: 5.14 M/UL (ref 4.7–6.1)
SODIUM SERPL-SCNC: 137 MMOL/L (ref 135–145)
TRIGL SERPL-MCNC: 114 MG/DL (ref 0–149)
TSH SERPL DL<=0.005 MIU/L-ACNC: 3.7 UIU/ML (ref 0.38–5.33)
VIT B12 SERPL-MCNC: 481 PG/ML (ref 211–911)
WBC # BLD AUTO: 7.5 K/UL (ref 4.8–10.8)

## 2021-05-04 PROCEDURE — 84460 ALANINE AMINO (ALT) (SGPT): CPT

## 2021-05-04 PROCEDURE — 82570 ASSAY OF URINE CREATININE: CPT

## 2021-05-04 PROCEDURE — 85025 COMPLETE CBC W/AUTO DIFF WBC: CPT

## 2021-05-04 PROCEDURE — 36415 COLL VENOUS BLD VENIPUNCTURE: CPT

## 2021-05-04 PROCEDURE — 84443 ASSAY THYROID STIM HORMONE: CPT

## 2021-05-04 PROCEDURE — 82043 UR ALBUMIN QUANTITATIVE: CPT

## 2021-05-04 PROCEDURE — 83036 HEMOGLOBIN GLYCOSYLATED A1C: CPT

## 2021-05-04 PROCEDURE — 80061 LIPID PANEL: CPT

## 2021-05-04 PROCEDURE — 82607 VITAMIN B-12: CPT

## 2021-05-04 PROCEDURE — 82306 VITAMIN D 25 HYDROXY: CPT

## 2021-05-04 PROCEDURE — 80048 BASIC METABOLIC PNL TOTAL CA: CPT

## 2021-05-04 PROCEDURE — 84153 ASSAY OF PSA TOTAL: CPT

## 2021-05-06 ENCOUNTER — IMMUNIZATION (OUTPATIENT)
Dept: FAMILY PLANNING/WOMEN'S HEALTH CLINIC | Facility: IMMUNIZATION CENTER | Age: 66
End: 2021-05-06
Payer: COMMERCIAL

## 2021-05-06 DIAGNOSIS — Z23 ENCOUNTER FOR VACCINATION: Primary | ICD-10-CM

## 2021-05-06 LAB — 25(OH)D3 SERPL-MCNC: 37 NG/ML (ref 30–80)

## 2021-05-06 PROCEDURE — 91300 PFIZER SARS-COV-2 VACCINE: CPT | Performed by: INTERNAL MEDICINE

## 2021-05-06 PROCEDURE — 0002A PFIZER SARS-COV-2 VACCINE: CPT | Performed by: INTERNAL MEDICINE

## 2021-05-12 ENCOUNTER — OFFICE VISIT (OUTPATIENT)
Dept: MEDICAL GROUP | Facility: PHYSICIAN GROUP | Age: 66
End: 2021-05-12
Payer: COMMERCIAL

## 2021-05-12 VITALS
RESPIRATION RATE: 16 BRPM | HEART RATE: 97 BPM | HEIGHT: 68 IN | OXYGEN SATURATION: 96 % | WEIGHT: 174 LBS | DIASTOLIC BLOOD PRESSURE: 82 MMHG | TEMPERATURE: 98.9 F | SYSTOLIC BLOOD PRESSURE: 138 MMHG | BODY MASS INDEX: 26.37 KG/M2

## 2021-05-12 DIAGNOSIS — Z87.19 HISTORY OF SMALL BOWEL OBSTRUCTION: ICD-10-CM

## 2021-05-12 DIAGNOSIS — K22.719 BARRETT'S ESOPHAGUS WITH DYSPLASIA: Chronic | ICD-10-CM

## 2021-05-12 DIAGNOSIS — I10 ESSENTIAL HYPERTENSION: Chronic | ICD-10-CM

## 2021-05-12 DIAGNOSIS — K21.9 GASTROESOPHAGEAL REFLUX DISEASE WITHOUT ESOPHAGITIS: Chronic | ICD-10-CM

## 2021-05-12 DIAGNOSIS — E78.5 DYSLIPIDEMIA: ICD-10-CM

## 2021-05-12 PROCEDURE — 99214 OFFICE O/P EST MOD 30 MIN: CPT | Performed by: INTERNAL MEDICINE

## 2021-05-12 RX ORDER — OMEPRAZOLE 40 MG/1
CAPSULE, DELAYED RELEASE ORAL
COMMUNITY
Start: 2021-03-11 | End: 2021-05-12 | Stop reason: SDUPTHER

## 2021-05-12 RX ORDER — CLINDAMYCIN HYDROCHLORIDE 300 MG/1
300 CAPSULE ORAL 3 TIMES DAILY
Qty: 21 CAPSULE | Refills: 0 | Status: SHIPPED | OUTPATIENT
Start: 2021-05-12

## 2021-05-12 RX ORDER — POLYETHYLENE GLYCOL 3350, SODIUM SULFATE, SODIUM CHLORIDE, POTASSIUM CHLORIDE, ASCORBIC ACID, SODIUM ASCORBATE 140-9-5.2G
KIT ORAL
COMMUNITY
Start: 2021-03-03 | End: 2021-05-12

## 2021-05-12 RX ORDER — OMEPRAZOLE 40 MG/1
40 CAPSULE, DELAYED RELEASE ORAL DAILY
Qty: 90 CAPSULE | Refills: 3 | Status: SHIPPED | OUTPATIENT
Start: 2021-05-12

## 2021-05-12 RX ORDER — LISINOPRIL AND HYDROCHLOROTHIAZIDE 20; 12.5 MG/1; MG/1
1 TABLET ORAL DAILY
Qty: 90 TABLET | Refills: 3 | Status: SHIPPED | OUTPATIENT
Start: 2021-05-12 | End: 2022-05-16

## 2021-05-12 RX ORDER — CELECOXIB 200 MG/1
200 CAPSULE ORAL
COMMUNITY
Start: 2021-04-21

## 2021-05-12 ASSESSMENT — FIBROSIS 4 INDEX: FIB4 SCORE: 1.06

## 2021-05-12 NOTE — ASSESSMENT & PLAN NOTE
Chronic condition per the patient takes omeprazole daily.  He is requesting refill.  No side effects reported.  No new GI symptoms reported.

## 2021-05-12 NOTE — PROGRESS NOTES
CC: Follow-up hyperlipidemia  Check incision site from recent surgery      HPI: This is a 65 y.o. pt.  Pt's medical history is notable for:     History of small bowel obstruction  Patient reported history of small bowel obstruction status post laparotomy exploratory, lysis of adhesion December 10, 2020.  Patient noted some redness and crusting at the incision site.  He denies fever or chills.    Ponce's esophagus with dysplasia  Chronic condition for the patient followed by digestive health specialist.  EGD was done 2019.  Recommend patient to follow-up with GI specialist to consider follow-up EGD.  He denies nausea vomiting dysphagia or unexplained weight loss.    Dyslipidemia  Chronic condition per the patient recently had lab done showed  Results for RAYO HURTADO (MRN [id number]) as of 5/12/2021 09:49   Ref. Range 5/4/2021 06:14   Cholesterol,Tot Latest Ref Range: 100 - 199 mg/dL 220 (H)   Triglycerides Latest Ref Range: 0 - 149 mg/dL 114   HDL Latest Ref Range: >=40 mg/dL 53   LDL Latest Ref Range: <100 mg/dL 144 (H)   Discussed with the patient regarding the potential risks of uncontrolled hyperlipidemia.  We also discussed the use of statin.  The patient however declined.  He wishes to work on his diet and exercise and has agreed to come back proximately 6 months for repeat blood test.    Essential hypertension  Stable condition per the patient is presently taking lisinopril/hydrochlorothiazide.  Blood pressure has been well controlled.  It is 130/82 today.    Gastroesophageal reflux disease without esophagitis  Chronic condition per the patient takes omeprazole daily.  He is requesting refill.  No side effects reported.  No new GI symptoms reported.          REVIEW OF SYSTEMS:     Constitutional:  no fever / chills   Neurologic: no headaches  Eyes: no changes in vision  ENT: no sore throat, no hearing loss  CV:  no chest pain, no palpitations  Pulmonary: no SOB, no cough          Allergies:  Penicillins    Current Outpatient Medications Ordered in Epic   Medication Sig Dispense Refill   • celecoxib (CELEBREX) 200 MG Cap Take 200 mg by mouth every day.     • clindamycin (CLEOCIN) 300 MG Cap Take 1 capsule by mouth 3 times a day. 21 capsule 0   • lisinopril-hydrochlorothiazide (PRINZIDE) 20-12.5 MG per tablet Take 1 tablet by mouth every day. 90 tablet 3   • omeprazole (PRILOSEC) 40 MG delayed-release capsule Take 1 capsule by mouth every day. 90 capsule 3   • metronidazole (METROCREAM) 0.75 % cream APPLY TO PIMPLE BUMPS ON FACE DAILY     • cyanocobalamin (VITAMIN B-12) 500 MCG Tab Take 500 mcg by mouth every day.     • Cholecalciferol (VITAMIN D-3) 5000 units Tab Take 1 Tab by mouth every day.     • Turmeric 500 MG Tab Take 1 Tab by mouth every day.     • Milk Thistle 250 MG Cap Take 1 Cap by mouth every day.     • vitamin e (VITAMIN E) 400 UNIT Cap Take 400 Units by mouth every day.     • thiamine (THIAMINE) 100 MG Tab Take 100 mg by mouth every day.       No current [facility]-ordered facility-administered medications on file.       Past Medical, Social, and Family history reviewed and updated in EPIC     ------------------------------------------------------------------------------     PHYSICAL EXAM:   Vitals:    05/12/21 0902   BP: 138/82   Pulse: 97   Resp: 16   Temp: 37.2 °C (98.9 °F)   SpO2: 96%      Body mass index is 26.46 kg/m².         Constitutional: no acute distress  CV: heart RRR  Resp: normal effort, no wheezing or rales.  GI: abdomen soft, no obvious mass, no tenderness  Neuro: CN 2-12 grossly intact  Abdomen incision site show to small erythematous area approximately 3 and 4 mm respectively.  There are scab formation noted.  No fluctuance or discharge noted      -----------------------------------------------------------------------------    ASSESSMENT:   1. Essential hypertension  lisinopril-hydrochlorothiazide (PRINZIDE) 20-12.5 MG per tablet   2. Dyslipidemia  ALANINE AMINO-TRANS    Lipid  Profile   3. History of small bowel obstruction     4. Ponce's esophagus with dysplasia     5. Gastroesophageal reflux disease without esophagitis             MEDICAL DECISION MAKING: DISCUSSION / STATUS / PLAN:    Hypertension.  Stable.  Continue with lisinopril/hydrochlorothiazide    Hyperlipidemia.  As above the patient not interested in taking a medication at this time.  Recommend the patient regarding low-fat low-cholesterol diet and to continue exercise.  We will plan to repeat lipid panel in 6 months.  If the numbers are still high the patient may reconsider taking medication.    Ponce's esophagus/GE reflux.  Continue follow-up with GI specialist.  Refill omeprazole.    History of small bowel obstruction status post surgery  There are 2 erythematous lesion at the incision site.  Recommend the patient to follow-up with the surgeon.  Recommend patient to start clindamycin oral antibiotic for 7 days to prevent infection.  Patient is allergic to penicillin.  Advised the patient to go to ER if symptoms worsen or change.           -If any problems should arise, patient was advised to contact our office or go to ER to be evaluated.    Please note that this dictation was created using voice recognition software. I have made every reasonable attempt to correct obvious errors, but it is possible there are errors of grammar and possibly content that I did not discover before finalizing the note.

## 2021-05-12 NOTE — ASSESSMENT & PLAN NOTE
Patient reported history of small bowel obstruction status post laparotomy exploratory, lysis of adhesion December 10, 2020.  Patient noted some redness and crusting at the incision site.  He denies fever or chills.

## 2021-05-12 NOTE — ASSESSMENT & PLAN NOTE
Chronic condition for the patient followed by digestive health specialist.  EGD was done 2019.  Recommend patient to follow-up with GI specialist to consider follow-up EGD.  He denies nausea vomiting dysphagia or unexplained weight loss.

## 2021-05-12 NOTE — ASSESSMENT & PLAN NOTE
Chronic condition per the patient recently had lab done showed  Results for RAYO HURTADO (MRN [id number]) as of 5/12/2021 09:49   Ref. Range 5/4/2021 06:14   Cholesterol,Tot Latest Ref Range: 100 - 199 mg/dL 220 (H)   Triglycerides Latest Ref Range: 0 - 149 mg/dL 114   HDL Latest Ref Range: >=40 mg/dL 53   LDL Latest Ref Range: <100 mg/dL 144 (H)   Discussed with the patient regarding the potential risks of uncontrolled hyperlipidemia.  We also discussed the use of statin.  The patient however declined.  He wishes to work on his diet and exercise and has agreed to come back proximately 6 months for repeat blood test.

## 2021-05-12 NOTE — ASSESSMENT & PLAN NOTE
Stable condition per the patient is presently taking lisinopril/hydrochlorothiazide.  Blood pressure has been well controlled.  It is 130/82 today.

## 2021-11-12 PROBLEM — R74.8 ELEVATED LIVER ENZYMES: Status: ACTIVE | Noted: 2021-11-12

## 2021-11-17 ENCOUNTER — APPOINTMENT (OUTPATIENT)
Dept: MEDICAL GROUP | Facility: PHYSICIAN GROUP | Age: 66
End: 2021-11-17
Payer: COMMERCIAL